# Patient Record
Sex: MALE | Race: WHITE | Employment: OTHER | URBAN - METROPOLITAN AREA
[De-identification: names, ages, dates, MRNs, and addresses within clinical notes are randomized per-mention and may not be internally consistent; named-entity substitution may affect disease eponyms.]

---

## 2017-02-01 ENCOUNTER — OP HISTORICAL/CONVERTED ENCOUNTER (OUTPATIENT)
Dept: OTHER | Age: 52
End: 2017-02-01

## 2017-05-25 ENCOUNTER — OP HISTORICAL/CONVERTED ENCOUNTER (OUTPATIENT)
Dept: OTHER | Age: 52
End: 2017-05-25

## 2017-05-26 ENCOUNTER — OP HISTORICAL/CONVERTED ENCOUNTER (OUTPATIENT)
Dept: OTHER | Age: 52
End: 2017-05-26

## 2017-08-10 ENCOUNTER — OFFICE VISIT (OUTPATIENT)
Dept: NEUROLOGY | Age: 52
End: 2017-08-10

## 2017-08-10 VITALS
OXYGEN SATURATION: 98 % | HEART RATE: 75 BPM | HEIGHT: 71 IN | DIASTOLIC BLOOD PRESSURE: 78 MMHG | BODY MASS INDEX: 29.26 KG/M2 | SYSTOLIC BLOOD PRESSURE: 140 MMHG | WEIGHT: 209 LBS

## 2017-08-10 DIAGNOSIS — G43.009 MIGRAINE WITHOUT AURA AND WITHOUT STATUS MIGRAINOSUS, NOT INTRACTABLE: ICD-10-CM

## 2017-08-10 DIAGNOSIS — G44.309 POST-CONCUSSION HEADACHE: ICD-10-CM

## 2017-08-10 DIAGNOSIS — G44.40 MEDICATION OVERUSE HEADACHE: Primary | ICD-10-CM

## 2017-08-10 DIAGNOSIS — G44.221 CHRONIC TENSION-TYPE HEADACHE, INTRACTABLE: ICD-10-CM

## 2017-08-10 RX ORDER — METHYLPREDNISOLONE 4 MG/1
TABLET ORAL
Qty: 1 DOSE PACK | Refills: 0 | Status: SHIPPED | OUTPATIENT
Start: 2017-08-10 | End: 2018-06-08 | Stop reason: ALTCHOICE

## 2017-08-10 RX ORDER — ACETAMINOPHEN 325 MG/1
TABLET ORAL
COMMUNITY

## 2017-08-10 RX ORDER — FLUTICASONE PROPIONATE AND SALMETEROL 500; 50 UG/1; UG/1
1 POWDER RESPIRATORY (INHALATION) EVERY 12 HOURS
COMMUNITY

## 2017-08-10 RX ORDER — SUMATRIPTAN 100 MG/1
TABLET, FILM COATED ORAL
Qty: 9 TAB | Refills: 1 | Status: SHIPPED | OUTPATIENT
Start: 2017-08-10 | End: 2017-10-26 | Stop reason: SDUPTHER

## 2017-08-10 RX ORDER — MONTELUKAST SODIUM 10 MG/1
10 TABLET ORAL DAILY
COMMUNITY

## 2017-08-10 RX ORDER — TOPIRAMATE 25 MG/1
TABLET ORAL
Qty: 45 TAB | Refills: 0 | Status: SHIPPED | OUTPATIENT
Start: 2017-08-10 | End: 2017-10-10

## 2017-08-10 RX ORDER — CETIRIZINE HCL 10 MG
TABLET ORAL
COMMUNITY

## 2017-08-10 RX ORDER — GABAPENTIN 300 MG/1
300 CAPSULE ORAL
COMMUNITY
End: 2019-05-24

## 2017-08-10 RX ORDER — OMEPRAZOLE 20 MG/1
20 CAPSULE, DELAYED RELEASE ORAL DAILY
COMMUNITY
End: 2019-05-24

## 2017-08-10 RX ORDER — LEVOFLOXACIN 500 MG/1
TABLET, FILM COATED ORAL DAILY
COMMUNITY
End: 2019-05-24

## 2017-08-10 RX ORDER — ALBUTEROL SULFATE 90 UG/1
AEROSOL, METERED RESPIRATORY (INHALATION)
COMMUNITY

## 2017-08-10 RX ORDER — TOPIRAMATE 50 MG/1
TABLET, FILM COATED ORAL
Qty: 60 TAB | Refills: 0 | Status: SHIPPED | OUTPATIENT
Start: 2017-08-10 | End: 2017-10-10 | Stop reason: SDUPTHER

## 2017-08-10 RX ORDER — TAMSULOSIN HYDROCHLORIDE 0.4 MG/1
0.4 CAPSULE ORAL DAILY
COMMUNITY

## 2017-08-10 RX ORDER — SULFAMETHOXAZOLE AND TRIMETHOPRIM 800; 160 MG/1; MG/1
1 TABLET ORAL 2 TIMES DAILY
COMMUNITY
End: 2018-06-08 | Stop reason: ALTCHOICE

## 2017-08-10 RX ORDER — PRAZOSIN HYDROCHLORIDE 2 MG/1
2 CAPSULE ORAL
COMMUNITY
End: 2019-05-24

## 2017-08-10 RX ORDER — FLUTICASONE PROPIONATE 50 MCG
2 SPRAY, SUSPENSION (ML) NASAL DAILY
COMMUNITY

## 2017-08-10 RX ORDER — IBUPROFEN 800 MG/1
TABLET ORAL
COMMUNITY
End: 2017-08-10

## 2017-08-10 RX ORDER — ESZOPICLONE 2 MG/1
3 TABLET, FILM COATED ORAL
COMMUNITY
End: 2019-05-24

## 2017-08-10 RX ORDER — SERTRALINE HYDROCHLORIDE 100 MG/1
TABLET, FILM COATED ORAL DAILY
COMMUNITY
End: 2019-05-24

## 2017-08-10 NOTE — PROGRESS NOTES
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 He describes being in the [employer] for 30 years and started having headaches around . 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 He describes his headaches as being daily, intermittent, moderate throbbing pain across forehead, associated with light sensitivity, no sound sensitivity, occasional nausea, no vomiting. Headache lasts for most of the work day, better in evening. Sometimes has AM headache. Does snore and going to get checked for CYNTHIA tomorrow. Had been taking Motrin 800 mg one to two tabs, twice a day for a couple years for both the headaches and multiple areas of body pain/ aching. Never been on a daily headache preventive medication. Never tried triptans, never tried Fioricet/ butalbital.  Combination of Anxiety, Depression, PTSD and taking Zoloft and Prazosin.       Complete Review of Systems: + anxiety, chest pain, depression, diarrhea, falls, fatigue, headache, hearing loss, memory difficulty, muscle pain, muscle weakness, dyspnea, snoring, stomach pain, weight changes; otherwise as noted in HPI     No Known Allergies  Past Medical History:   Diagnosis Date    Liver disease     Migraine     PTSD (post-traumatic stress disorder) 2017     Current Outpatient Prescriptions   Medication Sig Dispense Refill    albuterol (PROVENTIL HFA, VENTOLIN HFA, PROAIR HFA) 90 mcg/actuation inhaler Take  by inhalation.  acetaminophen (TYLENOL) 325 mg tablet Take  by mouth every four (4) hours as needed for Pain.  montelukast (SINGULAIR) 10 mg tablet Take 10 mg by mouth daily.  cetirizine (ZYRTEC) 10 mg tablet Take  by mouth.  fish oil-omega-3 fatty acids 340-1,000 mg capsule Take 1 Cap by mouth daily.  prazosin (MINIPRESS) 2 mg capsule Take 2 mg by mouth nightly.  fluticasone-salmeterol (ADVAIR DISKUS) 500-50 mcg/dose diskus inhaler Take 1 Puff by inhalation every twelve (12) hours.  gabapentin (NEURONTIN) 300 mg capsule Take 300 mg by mouth. 2 caps PO qhs      omeprazole (PRILOSEC) 20 mg capsule Take 20 mg by mouth daily.  levoFLOXacin (LEVAQUIN) 500 mg tablet Take  by mouth daily.  fluticasone (FLONASE) 50 mcg/actuation nasal spray 2 Sprays by Both Nostrils route daily.  eszopiclone (LUNESTA) 2 mg tablet Take  by mouth nightly.  sertraline (ZOLOFT) 100 mg tablet Take  by mouth daily.  trimethoprim-sulfamethoxazole (BACTRIM DS, SEPTRA DS) 160-800 mg per tablet Take 1 Tab by mouth two (2) times a day.  tamsulosin (FLOMAX) 0.4 mg capsule Take 0.4 mg by mouth daily.  CELECOXIB (CELEBREX PO) Take  by mouth.  methylPREDNISolone (MEDROL DOSEPACK) 4 mg tablet Take as directed on package. Take with food in AM.  For rebound headache. 1 Dose Pack 0    SUMAtriptan (IMITREX) 100 mg tablet 1 tab at onset of severe headache/ migraine. Repeat 1 tab in 2 hours if headache remains. Limit: 2 tabs in 24 hours, max 2 days in a week. 9 Tab 1    topiramate (TOPAMAX) 25 mg tablet Week 1: 1 tab in PM.  Week 2: 1 tab twice a day. Week 3: 1 tab AM and 2 tabs PM.  Week 4: CHANGE TO 50 mg tablets 45 Tab 0    topiramate (TOPAMAX) 50 mg tablet Take 1 tab twice a day for migraine prevention 60 Tab 0     History reviewed. No pertinent surgical history.   Family History   Problem Relation Age of Onset    Cancer Mother     Cancer Father      Social History     Social History    Marital status:      Spouse name: N/A    Number of children: N/A    Years of education: N/A     Occupational History    Not on file. Social History Main Topics    Smoking status: Former Smoker     Quit date: 8/10/2001    Smokeless tobacco: Never Used    Alcohol use Yes      Comment: 10-12 beer, wine, vodka    Drug use: Not on file    Sexual activity: Not on file     Other Topics Concern    Not on file     Social History Narrative    No narrative on file       PHYSICAL EXAM  Vitals:    08/10/17 1508   BP: 140/78   BP 1 Location: Left arm   BP Patient Position: Sitting   Pulse: 75   SpO2: 98%   Weight: 94.8 kg (209 lb)   Height: 5' 11\" (1.803 m)       General:  Alert, cooperative, NAD   Head:  Normocephalic, atraumatic   Eyes:  Conjunctivae/corneas clear   Lungs:  Heart:  Non labored breathing  Regular rate, rhythm   Extremities: No edema. Skin: No rashes    Neurologic Exam       Language: normal  Memory:  Alert, oriented to person, place, situation    Cranial Nerves:  I: smell Not tested   II: visual fields Full to confrontation   II: pupils Equal, round, reactive to light   II: optic disc No papilledema   III,VII: ptosis none   III,IV,VI: extraocular muscles  normal   V: facial light touch sensation  normal   VII: facial muscle function  symmetric   VIII: hearing symmetric   IX: soft palate elevation  normal   XI: sternocleidomastoid strength 5/5   XII: tongue  midline      Motor: normal bulk, tone, strength in all exts    Sensory: mild patchy reduced temperature in right foot/ lower leg, but otherwise intact LT, proprioception, temperature and vibration in all extremities; Cerebellar: no rest, postural, or intention tremor. Normal FNF and H-Shin bilaterally. Reflexes: 2+ throughout. Plantar response: neutral bilaterally  . Gait: normal gait including tandem.   Romberg negative     No outside clinic notes/ imaging reports available for review    ASSESSMENT AND PLAN ICD-10-CM ICD-9-CM    1. Medication overuse headache G44.40 339.3 methylPREDNISolone (MEDROL DOSEPACK) 4 mg tablet   2. Chronic tension-type headache, intractable G44.221 339.12 topiramate (TOPAMAX) 25 mg tablet      topiramate (TOPAMAX) 50 mg tablet   3. Post-concussion headache G44.309 339.20 topiramate (TOPAMAX) 25 mg tablet      topiramate (TOPAMAX) 50 mg tablet   4. Migraine without aura and without status migrainosus, not intractable G43.009 346.10 SUMAtriptan (IMITREX) 100 mg tablet      topiramate (TOPAMAX) 25 mg tablet      topiramate (TOPAMAX) 50 mg tablet       46 y.o. male with hx of multiple minor head injuries consistent with mild concussions, medication overuse headache, chronic tension headache (hx of anxiety, depression, PTSD), component of episodic migraine headache (without aura), and potential CYNTHIA (to have sleep study soon) causing some portion of his headaches. Extended discussion regardin) Medication overuse headache: pt stopped taking frequent ibuprofen 2 weeks ago and was put on Celebrex by Orthopedics. He may be experiencing withdrawal headache so gave him Rx of Medrol DosePak to take. Advised not to use a headache pain reliever more than 2 days a week or will lead to having [address]. 2) Chronic tension headache: pt working with Psychiatry to address underlying mood and sleep difficulties. 3) Potential CYNTHIA: to have sleep study and if diagnosed with CYNTHIA, treating that could reduce his headaches. 4) Post-concussion headache: I think this is playing a role in his headache frequency. No specific treatment/ medication for concussion symptoms but being on a daily headache preventive may help reduce his headaches as a whole, regardless of headache type. 5) Migraine without aura: in addition to trying a daily HA preventive, gave patient for Imitrx 100 mg tab to use prn migraine/ severe headache, up to 2 days in a week.   6) Discussed different headache preventive medication options: propranolol (not an option due to his underlying asthma), topamax (may reduce headaches, not sure how it would affect his mood), or amitriptyline (may help with his insomnia, not sure how it would affect mood)     Agreed that I would give him Rxs for Topamax 25 mg (tapering up to 50 mg BID) and he would not start them until he saw his psychiatrist and got clearance to do so. Also wrote a brief note in his patient instructions for him to ask Psychiatrist if changing his sleep medication to Amitriptyline would be appropriate as it could help the insomnia and reduce the headache frequency.         F/u in 7 weeks        Sincerely,  Majo Garay MD

## 2017-08-10 NOTE — MR AVS SNAPSHOT
Visit Information Date & Time Provider Department Dept. Phone Encounter #  
 8/10/2017  3:00 PM Kerry Valdez MD [facility] located within [facility] [phone] [id number] Follow-up Instructions Return in about 6 weeks (around 9/21/2017). Your Appointments 9/25/2017  3:00 PM  
Follow Up with Kerry Valdez MD  
[facility]) Appt Note: follow up headache [place] [address] [facility] [id number] [phone]  
  
   
 [place] [address] Upcoming Health Maintenance Date Due Hepatitis C Screening 1965 DTaP/Tdap/Td series (1 - Tdap) 3/4/1986 FOBT Q 1 YEAR AGE 50-75 3/4/2015 INFLUENZA AGE 9 TO ADULT 8/1/2017 Allergies as of 8/10/2017  Review Complete On: 8/10/2017 By: Imtiaz Lund LPN No Known Allergies Current Immunizations  Never Reviewed No immunizations on file. Not reviewed this visit You Were Diagnosed With   
  
 Codes Comments Medication overuse headache    -  Primary ICD-10-CM: G44.40 ICD-9-CM: 339. 3 Chronic tension-type headache, intractable     ICD-10-CM: G78.813 ICD-9-CM: 339.12 Post-concussion headache     ICD-10-CM: T57.674 ICD-9-CM: 339.20 Migraine without aura and without status migrainosus, not intractable     ICD-10-CM: D40.850 ICD-9-CM: 346.10 Vitals BP Pulse Height(growth percentile) Weight(growth percentile) SpO2 BMI  
 140/78 (BP 1 Location: Left arm, BP Patient Position: Sitting) 75 5' 11\" (1.803 m) 209 lb (94.8 kg) 98% 29.15 kg/m2 Smoking Status Former Smoker Vitals History BMI and BSA Data Body Mass Index Body Surface Area  
 29.15 kg/m 2 2.18 m 2 Preferred Pharmacy Pharmacy Name Phone [address] [phone] Your Updated Medication List  
  
   
 This list is accurate as of: 8/10/17  4:05 PM.  Always use your most recent med list.  
  
  
  
  
 acetaminophen 325 mg tablet Commonly known as:  TYLENOL Take  by mouth every four (4) hours as needed for Pain. ADVAIR DISKUS 500-50 mcg/dose diskus inhaler Generic drug:  fluticasone-salmeterol Take 1 Puff by inhalation every twelve (12) hours. albuterol 90 mcg/actuation inhaler Commonly known as:  PROVENTIL HFA, VENTOLIN HFA, PROAIR HFA Take  by inhalation. CELEBREX PO Take  by mouth. cetirizine 10 mg tablet Commonly known as:  ZYRTEC Take  by mouth. fish oil-omega-3 fatty acids 340-1,000 mg capsule Take 1 Cap by mouth daily. FLOMAX 0.4 mg capsule Generic drug:  tamsulosin Take 0.4 mg by mouth daily. fluticasone 50 mcg/actuation nasal spray Commonly known as:  Jazmin Mealy 2 Sprays by Both Nostrils route daily. gabapentin 300 mg capsule Commonly known as:  NEURONTIN Take 300 mg by mouth. 2 caps PO qhs  
  
 LEVAQUIN 500 mg tablet Generic drug:  levoFLOXacin Take  by mouth daily. LUNESTA 2 mg tablet Generic drug:  eszopiclone Take  by mouth nightly. methylPREDNISolone 4 mg tablet Commonly known as:  Jodeane Hove Take as directed on package. Take with food in AM.  For rebound headache.  
  
 montelukast 10 mg tablet Commonly known as:  SINGULAIR Take 10 mg by mouth daily. omeprazole 20 mg capsule Commonly known as:  PRILOSEC Take 20 mg by mouth daily. prazosin 2 mg capsule Commonly known as:  MINIPRESS Take 2 mg by mouth nightly. SUMAtriptan 100 mg tablet Commonly known as:  IMITREX  
1 tab at onset of severe headache/ migraine. Repeat 1 tab in 2 hours if headache remains. Limit: 2 tabs in 24 hours, max 2 days in a week. * topiramate 25 mg tablet Commonly known as:  TOPAMAX Week 1: 1 tab in PM.  Week 2: 1 tab twice a day.   Week 3: 1 tab AM and 2 tabs PM.  Week 4: CHANGE TO 50 mg tablets * topiramate 50 mg tablet Commonly known as:  TOPAMAX Take 1 tab twice a day for migraine prevention  
  
 trimethoprim-sulfamethoxazole 160-800 mg per tablet Commonly known as:  BACTRIM DS, SEPTRA DS Take 1 Tab by mouth two (2) times a day. ZOLOFT 100 mg tablet Generic drug:  sertraline Take  by mouth daily. * Notice: This list has 2 medication(s) that are the same as other medications prescribed for you. Read the directions carefully, and ask your doctor or other care provider to review them with you. Prescriptions Printed Refills  
 methylPREDNISolone (MEDROL DOSEPACK) 4 mg tablet 0 Sig: Take as directed on package. Take with food in AM.  For rebound headache. Class: Print SUMAtriptan (IMITREX) 100 mg tablet 1 Si tab at onset of severe headache/ migraine. Repeat 1 tab in 2 hours if headache remains. Limit: 2 tabs in 24 hours, max 2 days in a week. Class: Print  
 topiramate (TOPAMAX) 25 mg tablet 0 Sig: Week 1: 1 tab in PM.  Week 2: 1 tab twice a day. Week 3: 1 tab AM and 2 tabs PM.  Week 4: CHANGE TO 50 mg tablets Class: Print  
 topiramate (TOPAMAX) 50 mg tablet 0 Sig: Take 1 tab twice a day for migraine prevention Class: Print Follow-up Instructions Return in about 6 weeks (around 2017). Patient Instructions 1. Discussed that Topamax may help reduce your headache frequency but because it's can affect your mood (\"mood stabilizer\") I don't want you to start it until you discuss with your Psychiatrist. 
 
2. Another topic to discuss with your Psychiatrist is whether Amitriptyline would be a good choice for the insomnia, headaches, and mood/ depression. I'll defer to him whether to start that medication 3. Discussed the steroid pack and when to take 4. Use the sumatriptan for the bad/ severe headaches only (for the migraine headaches). 5. See you back in 7 weeks Introducing [facility]! [employer] introduces [employer] patient portal. Now you can access parts of your medical record, email your doctor's office, and request medication refills online. 1. In your internet browser, go to [email or web address] 2. Click on the First Time User? Click Here link in the Sign In box. You will see the New Member Sign Up page. 3. Enter your [employer] Access Code exactly as it appears below. You will not need to use this code after youve completed the sign-up process. If you do not sign up before the expiration date, you must request a new code. · [employer] Access Code: [email or web address] Expires: 11/8/2017  2:31 PM 
 
4. Enter the last four digits of your Social Security Number (xxxx) and Date of Birth (mm/dd/yyyy) as indicated and click Submit. You will be taken to the next sign-up page. 5. Create a [employer] ID. This will be your [employer] login ID and cannot be changed, so think of one that is secure and easy to remember. 6. Create a [employer] password. You can change your password at any time. 7. Enter your Password Reset Question and Answer. This can be used at a later time if you forget your password. 8. Enter your e-mail address. You will receive e-mail notification when new information is available in [address] 9. Click Sign Up. You can now view and download portions of your medical record. 10. Click the Download Summary menu link to download a portable copy of your medical information. If you have questions, please visit the Frequently Asked Questions section of the [employer] website. Remember, [employer] is NOT to be used for urgent needs. For medical emergencies, dial 911. Now available from your iPhone and Android! Please provide this summary of care documentation to your next provider. Your primary care clinician is listed as Dahlia Rosario.  If you have any questions after today's visit, please call [phone].

## 2017-08-10 NOTE — PATIENT INSTRUCTIONS
1. Discussed that Topamax may help reduce your headache frequency but because it's can affect your mood (\"mood stabilizer\") I don't want you to start it until you discuss with your Psychiatrist.    2. Another topic to discuss with your Psychiatrist is whether Amitriptyline would be a good choice for the insomnia, headaches, and mood/ depression. I'll defer to him whether to start that medication    3. Discussed the steroid pack and when to take    4. Use the sumatriptan for the bad/ severe headaches only (for the migraine headaches).     5. See you back in 7 weeks

## 2017-10-10 DIAGNOSIS — G43.009 MIGRAINE WITHOUT AURA AND WITHOUT STATUS MIGRAINOSUS, NOT INTRACTABLE: ICD-10-CM

## 2017-10-10 DIAGNOSIS — G44.309 POST-CONCUSSION HEADACHE: ICD-10-CM

## 2017-10-10 DIAGNOSIS — G44.221 CHRONIC TENSION-TYPE HEADACHE, INTRACTABLE: ICD-10-CM

## 2017-10-10 RX ORDER — TOPIRAMATE 50 MG/1
TABLET, FILM COATED ORAL
Qty: 60 TAB | Refills: 0 | Status: SHIPPED | OUTPATIENT
Start: 2017-10-10 | End: 2017-10-26 | Stop reason: SDUPTHER

## 2017-10-10 RX ORDER — TOPIRAMATE 50 MG/1
TABLET, FILM COATED ORAL
Qty: 60 TAB | Refills: 0 | Status: SHIPPED | OUTPATIENT
Start: 2017-10-10 | End: 2017-10-10 | Stop reason: SDUPTHER

## 2017-10-10 NOTE — TELEPHONE ENCOUNTER
Pt cancelled appt on 10-5. Will send 30 day Rx but will need to be seen before then for further refills (i.e adjust f/u visit).   Ignaciax

## 2017-10-10 NOTE — TELEPHONE ENCOUNTER
----- Message from Talya Garces sent at 10/10/2017  8:20 AM EDT -----  Regarding: Dr. Carola Kussmaul request  Pt needs a refill for Topiranet, generic for Topamax sent to the [facility] in [address] [place] [address]. Pt will run out of medication tomorrow. Pt can be reached at [phone].

## 2017-10-11 ENCOUNTER — TELEPHONE (OUTPATIENT)
Dept: NEUROLOGY | Age: 52
End: 2017-10-11

## 2017-10-11 NOTE — TELEPHONE ENCOUNTER
----- Message from Juan Garza sent at 10/11/2017 10:46 AM EDT -----  Regarding: Dr Francisco Singh is returning a call to [facility], Dr. Lidia Guevara nurse for the second time. Pt would like a call back regarding a prescription refill. Please call pt back today. Pt can be reached at [phone].

## 2017-10-11 NOTE — TELEPHONE ENCOUNTER
Contacted patient and informed him his topmax was sent to the pharmacy yesterday.  R/s his fup for Wednesday, November 01, 2017 10:40 AM

## 2017-10-18 ENCOUNTER — TELEPHONE (OUTPATIENT)
Dept: NEUROLOGY | Age: 52
End: 2017-10-18

## 2017-10-18 NOTE — TELEPHONE ENCOUNTER
Called pt to reschedule 11/1 appt, he said you called him and told him he had to come in sooner than the 11/30 original appt in order to get a refill on his Topamax.

## 2017-10-26 ENCOUNTER — OFFICE VISIT (OUTPATIENT)
Dept: NEUROLOGY | Age: 52
End: 2017-10-26

## 2017-10-26 VITALS
HEIGHT: 71 IN | WEIGHT: 191 LBS | SYSTOLIC BLOOD PRESSURE: 112 MMHG | OXYGEN SATURATION: 98 % | DIASTOLIC BLOOD PRESSURE: 74 MMHG | BODY MASS INDEX: 26.74 KG/M2 | HEART RATE: 65 BPM

## 2017-10-26 DIAGNOSIS — G43.009 MIGRAINE WITHOUT AURA AND WITHOUT STATUS MIGRAINOSUS, NOT INTRACTABLE: Primary | ICD-10-CM

## 2017-10-26 DIAGNOSIS — G44.229 CHRONIC TENSION-TYPE HEADACHE, NOT INTRACTABLE: ICD-10-CM

## 2017-10-26 DIAGNOSIS — G44.309 POST-CONCUSSION HEADACHE: ICD-10-CM

## 2017-10-26 PROBLEM — Z91.199 NO-SHOW FOR APPOINTMENT: Status: RESOLVED | Noted: 2017-10-26 | Resolved: 2017-10-26

## 2017-10-26 PROBLEM — G44.221 CHRONIC TENSION-TYPE HEADACHE, INTRACTABLE: Status: RESOLVED | Noted: 2017-08-10 | Resolved: 2017-10-26

## 2017-10-26 PROBLEM — Z91.199 NO-SHOW FOR APPOINTMENT: Status: ACTIVE | Noted: 2017-10-26

## 2017-10-26 PROBLEM — G44.40 MEDICATION OVERUSE HEADACHE: Status: RESOLVED | Noted: 2017-08-10 | Resolved: 2017-10-26

## 2017-10-26 RX ORDER — SUMATRIPTAN 100 MG/1
TABLET, FILM COATED ORAL
Qty: 9 TAB | Refills: 1 | Status: SHIPPED | OUTPATIENT
Start: 2017-10-26 | End: 2018-04-26 | Stop reason: SDUPTHER

## 2017-10-26 RX ORDER — TOPIRAMATE 50 MG/1
TABLET, FILM COATED ORAL
Qty: 180 TAB | Refills: 1 | Status: SHIPPED | OUTPATIENT
Start: 2017-10-26 | End: 2018-04-26 | Stop reason: SDUPTHER

## 2017-10-26 NOTE — PROGRESS NOTES
Interval HPI:   This is a 46 y.o. male who is following up for     Chief Complaint   Patient presents with    Headache     4 per month lasting 1/2 a day without imitrex. Imitrex will take the headache away. At initial visit reported having daily, moderate, throbbing pain across forehead (light sensitivity, no sound sensitivity, occasional nausea, no vomiting), lasting for most of the work day, better in evening. Started pt on Topamax working up to 50 mg BID, cut out all caffeine and alcohol, and in process of retiring. He also tried Amitriptyline with his Psychiatrist but didn't help sleep or headaches, and triggered some PTSD. # of headache days a week: 3-4  # of migraine days a week: 1   Imitrex 100 mg tablets knock out migraine      Brief ROS: as above or otherwise negative  There have been no significant changes in PMHx, PSHx, SHx except as noted above. No Known Allergies  Current Outpatient Prescriptions   Medication Sig Dispense Refill    topiramate (TOPAMAX) 50 mg tablet Take 1 tab twice a day for migraine prevention 180 Tab 1    SUMAtriptan (IMITREX) 100 mg tablet 1 tab at onset of severe headache/ migraine. Repeat 1 tab in 2 hours if headache remains. Limit: max 2 days in a week. 9 Tab 1    albuterol (PROVENTIL HFA, VENTOLIN HFA, PROAIR HFA) 90 mcg/actuation inhaler Take  by inhalation.  montelukast (SINGULAIR) 10 mg tablet Take 10 mg by mouth daily.  cetirizine (ZYRTEC) 10 mg tablet Take  by mouth.  fluticasone-salmeterol (ADVAIR DISKUS) 500-50 mcg/dose diskus inhaler Take 1 Puff by inhalation every twelve (12) hours.  fluticasone (FLONASE) 50 mcg/actuation nasal spray 2 Sprays by Both Nostrils route daily.  eszopiclone (LUNESTA) 2 mg tablet Take 3 mg by mouth nightly.  tamsulosin (FLOMAX) 0.4 mg capsule Take 0.4 mg by mouth daily.  CELECOXIB (CELEBREX PO) Take  by mouth.       acetaminophen (TYLENOL) 325 mg tablet Take  by mouth every four (4) hours as needed for Pain.  fish oil-omega-3 fatty acids 340-1,000 mg capsule Take 1 Cap by mouth daily.  prazosin (MINIPRESS) 2 mg capsule Take 2 mg by mouth nightly.  gabapentin (NEURONTIN) 300 mg capsule Take 300 mg by mouth. 2 caps PO qhs      omeprazole (PRILOSEC) 20 mg capsule Take 20 mg by mouth daily.  levoFLOXacin (LEVAQUIN) 500 mg tablet Take  by mouth daily.  sertraline (ZOLOFT) 100 mg tablet Take  by mouth daily.  trimethoprim-sulfamethoxazole (BACTRIM DS, SEPTRA DS) 160-800 mg per tablet Take 1 Tab by mouth two (2) times a day.  methylPREDNISolone (MEDROL DOSEPACK) 4 mg tablet Take as directed on package. Take with food in AM.  For rebound headache. 1 Dose Pack 0       Physical Exam  Blood pressure 112/74, pulse 65, height 5' 11\" (1.803 m), weight 86.6 kg (191 lb), SpO2 98 %. No acute distress  Neck: no stiffness  Skin: no rashes    Focused Neurological Exam     Mental status: Alert and oriented to person, place situation. Language: normal fluency and comprehension; no dysarthria. CNs:   Visual fields grossly normal  Extraocular movements intact, no nystagmus  Face appears symmetric and facial strength normal.    Hearing is intact to casual conversation. Sensory: intact light touch in arms and legs  Motor: Normal bulk and strength in all 4 extremities. Reflexes: DTRs are symmetric, 2+ patellars  Gait: not observed    Impression      ICD-10-CM ICD-9-CM    1. Migraine without aura and without status migrainosus, not intractable G43.009 346.10 topiramate (TOPAMAX) 50 mg tablet      SUMAtriptan (IMITREX) 100 mg tablet   2. Chronic tension-type headache, not intractable G44.229 339.12    3.  Post-concussion headache G44.309 339.20 topiramate (TOPAMAX) 50 mg tablet         Has had drastic reduction in HA frequency after making lifestyle changes and starting topamax  Continue at current dose (setn 90 day Rx with 1 RF)  Renewed Rx Sumatriptan 100 mg tab prn migraine  F/u in 6 months

## 2017-10-26 NOTE — MR AVS SNAPSHOT
Visit Information Date & Time Provider Department Dept. Phone Encounter #  
 10/26/2017  9:40 AM Josi Uribe MD [facility] Neurology [facility] [phone] [id number] Follow-up Instructions Return in about 6 months (around 4/26/2018). Follow-up and Disposition History Upcoming Health Maintenance Date Due Hepatitis C Screening 1965 DTaP/Tdap/Td series (1 - Tdap) 3/4/1986 FOBT Q 1 YEAR AGE 50-75 3/4/2015 INFLUENZA AGE 9 TO ADULT 8/1/2017 Allergies as of 10/26/2017  Review Complete On: 10/26/2017 By: Gearl Kanner, LPN No Known Allergies Current Immunizations  Never Reviewed No immunizations on file. Not reviewed this visit You Were Diagnosed With   
  
 Codes Comments Migraine without aura and without status migrainosus, not intractable    -  Primary ICD-10-CM: G43.009 ICD-9-CM: 346.10 Chronic tension-type headache, not intractable     ICD-10-CM: S98.105 ICD-9-CM: 339.12 Post-concussion headache     ICD-10-CM: O80.740 ICD-9-CM: 339.20 Vitals BP Pulse Height(growth percentile) Weight(growth percentile) SpO2 BMI  
 112/74 (BP 1 Location: Left arm, BP Patient Position: Sitting) 65 5' 11\" (1.803 m) 191 lb (86.6 kg) 98% 26.64 kg/m2 Smoking Status Former Smoker Vitals History BMI and BSA Data Body Mass Index Body Surface Area  
 26.64 kg/m 2 2.08 m 2 Preferred Pharmacy Pharmacy Name Phone [facility] ISABELA BENSON [address], [address] [phone] Your Updated Medication List  
  
   
This list is accurate as of: 10/26/17 10:25 AM.  Always use your most recent med list.  
  
  
  
  
 acetaminophen 325 mg tablet Commonly known as:  TYLENOL Take  by mouth every four (4) hours as needed for Pain. ADVAIR DISKUS 500-50 mcg/dose diskus inhaler Generic drug:  fluticasone-salmeterol Take 1 Puff by inhalation every twelve (12) hours. albuterol 90 mcg/actuation inhaler Commonly known as:  PROVENTIL HFA, VENTOLIN HFA, PROAIR HFA Take  by inhalation. CELEBREX PO Take  by mouth. cetirizine 10 mg tablet Commonly known as:  ZYRTEC Take  by mouth. fish oil-omega-3 fatty acids 340-1,000 mg capsule Take 1 Cap by mouth daily. FLOMAX 0.4 mg capsule Generic drug:  tamsulosin Take 0.4 mg by mouth daily. fluticasone 50 mcg/actuation nasal spray Commonly known as:  Ro Blizzard 2 Sprays by Both Nostrils route daily. gabapentin 300 mg capsule Commonly known as:  NEURONTIN Take 300 mg by mouth. 2 caps PO qhs  
  
 LEVAQUIN 500 mg tablet Generic drug:  levoFLOXacin Take  by mouth daily. LUNESTA 2 mg tablet Generic drug:  eszopiclone Take 3 mg by mouth nightly. methylPREDNISolone 4 mg tablet Commonly known as:  Cleta Loss Take as directed on package. Take with food in AM.  For rebound headache.  
  
 montelukast 10 mg tablet Commonly known as:  SINGULAIR Take 10 mg by mouth daily. omeprazole 20 mg capsule Commonly known as:  PRILOSEC Take 20 mg by mouth daily. prazosin 2 mg capsule Commonly known as:  MINIPRESS Take 2 mg by mouth nightly. SUMAtriptan 100 mg tablet Commonly known as:  IMITREX  
1 tab at onset of severe headache/ migraine. Repeat 1 tab in 2 hours if headache remains. Limit: max 2 days in a week. topiramate 50 mg tablet Commonly known as:  TOPAMAX Take 1 tab twice a day for migraine prevention  
  
 trimethoprim-sulfamethoxazole 160-800 mg per tablet Commonly known as:  BACTRIM DS, SEPTRA DS Take 1 Tab by mouth two (2) times a day. ZOLOFT 100 mg tablet Generic drug:  sertraline Take  by mouth daily. Prescriptions Printed Refills SUMAtriptan (IMITREX) 100 mg tablet 1 Si tab at onset of severe headache/ migraine.   Repeat 1 tab in 2 hours if headache remains. Limit: max 2 days in a week. Class: Print Prescriptions Sent to Pharmacy Refills  
 topiramate (TOPAMAX) 50 mg tablet 1 Sig: Take 1 tab twice a day for migraine prevention Class: Normal  
 Pharmacy: [facility] [address]  #: [phone] Follow-up Instructions Return in about 6 months (around 4/26/2018). Introducing [facility] & HEALTH SERVICES! Deneen Vargas introduces [employer] patient portal. Now you can access parts of your medical record, email your doctor's office, and request medication refills online. 1. In your internet browser, go to [email or web address] 2. Click on the First Time User? Click Here link in the Sign In box. You will see the New Member Sign Up page. 3. Enter your [employer] Access Code exactly as it appears below. You will not need to use this code after youve completed the sign-up process. If you do not sign up before the expiration date, you must request a new code. · [employer] Access Code: [email or web address] Expires: 11/8/2017  2:31 PM 
 
4. Enter the last four digits of your Social Security Number (xxxx) and Date of Birth (mm/dd/yyyy) as indicated and click Submit. You will be taken to the next sign-up page. 5. Create a [employer] ID. This will be your [employer] login ID and cannot be changed, so think of one that is secure and easy to remember. 6. Create a [employer] password. You can change your password at any time. 7. Enter your Password Reset Question and Answer. This can be used at a later time if you forget your password. 8. Enter your e-mail address. You will receive e-mail notification when new information is available in [address] 9. Click Sign Up. You can now view and download portions of your medical record. 10. Click the Download Summary menu link to download a portable copy of your medical information.  
 
If you have questions, please visit the Frequently Asked Questions section of the [employer]. Remember, ResponseTek[employer] is NOT to be used for urgent needs. For medical emergencies, dial 911. Now available from your iPhone and Android! Please provide this summary of care documentation to your next provider. Your primary care clinician is listed as Don Breaker. If you have any questions after today's visit, please call [phone].

## 2018-04-26 ENCOUNTER — OFFICE VISIT (OUTPATIENT)
Dept: NEUROLOGY | Age: 53
End: 2018-04-26

## 2018-04-26 VITALS
OXYGEN SATURATION: 99 % | BODY MASS INDEX: 26.74 KG/M2 | WEIGHT: 191 LBS | DIASTOLIC BLOOD PRESSURE: 72 MMHG | SYSTOLIC BLOOD PRESSURE: 120 MMHG | HEIGHT: 71 IN | HEART RATE: 68 BPM

## 2018-04-26 DIAGNOSIS — G43.009 MIGRAINE WITHOUT AURA AND WITHOUT STATUS MIGRAINOSUS, NOT INTRACTABLE: ICD-10-CM

## 2018-04-26 DIAGNOSIS — G44.309 POST-CONCUSSION HEADACHE: Primary | ICD-10-CM

## 2018-04-26 DIAGNOSIS — F51.04 PSYCHOPHYSIOLOGICAL INSOMNIA: ICD-10-CM

## 2018-04-26 RX ORDER — SUMATRIPTAN 100 MG/1
TABLET, FILM COATED ORAL
Qty: 9 TAB | Refills: 2 | Status: SHIPPED | OUTPATIENT
Start: 2018-04-26 | End: 2019-02-22 | Stop reason: SDUPTHER

## 2018-04-26 RX ORDER — TOPIRAMATE 50 MG/1
TABLET, FILM COATED ORAL
Qty: 180 TAB | Refills: 2 | Status: SHIPPED | OUTPATIENT
Start: 2018-04-26 | End: 2019-04-05

## 2018-04-26 RX ORDER — QUETIAPINE FUMARATE 50 MG/1
TABLET, FILM COATED ORAL
Refills: 1 | COMMUNITY
Start: 2018-03-20 | End: 2019-05-24

## 2018-04-26 NOTE — PROGRESS NOTES
Interval HPI:   This is a 48 y.o. male who is following up for     Chief Complaint   Patient presents with    Migraine     1 every 2 weeks    Insomnia       Retired from [employer] since his last visit  Continues to do well with Topamax (50 mg BID) for headache prevention  # of headache days a week: 2-3  # of migraine days a month: 2-3 (was  1 day week)  Imitrex 100 mg tablets knock out migraine    C/o insomnia. Tried Amitriptyline but aggravated his PTSD  Underwent a sleep study that didn't show CYNTHIA but did show insomnia  He reports trying multiple different sleep meds, no help      Brief ROS: as above or otherwise negative  There have been no significant changes in PMHx, PSHx, SHx except as noted above. No Known Allergies  Current Outpatient Prescriptions   Medication Sig Dispense Refill    QUEtiapine (SEROQUEL) 50 mg tablet TAKE 1 TABLET BY MOUTH AT BEDTIME  1    topiramate (TOPAMAX) 50 mg tablet Take 1 tab twice a day for migraine prevention 180 Tab 2    SUMAtriptan (IMITREX) 100 mg tablet 1 tab at onset of severe headache/ migraine. Repeat 1 tab in 2 hours if headache remains. Limit: max 2 days in a week. 9 Tab 2    albuterol (PROVENTIL HFA, VENTOLIN HFA, PROAIR HFA) 90 mcg/actuation inhaler Take  by inhalation.  montelukast (SINGULAIR) 10 mg tablet Take 10 mg by mouth daily.  cetirizine (ZYRTEC) 10 mg tablet Take  by mouth.  fluticasone-salmeterol (ADVAIR DISKUS) 500-50 mcg/dose diskus inhaler Take 1 Puff by inhalation every twelve (12) hours.  omeprazole (PRILOSEC) 20 mg capsule Take 20 mg by mouth daily.  fluticasone (FLONASE) 50 mcg/actuation nasal spray 2 Sprays by Both Nostrils route daily.  sertraline (ZOLOFT) 100 mg tablet Take  by mouth daily.  tamsulosin (FLOMAX) 0.4 mg capsule Take 0.4 mg by mouth daily.  CELECOXIB (CELEBREX PO) Take  by mouth.  acetaminophen (TYLENOL) 325 mg tablet Take  by mouth every four (4) hours as needed for Pain.  fish oil-omega-3 fatty acids 340-1,000 mg capsule Take 1 Cap by mouth daily.  prazosin (MINIPRESS) 2 mg capsule Take 2 mg by mouth nightly.  gabapentin (NEURONTIN) 300 mg capsule Take 300 mg by mouth. 2 caps PO qhs      levoFLOXacin (LEVAQUIN) 500 mg tablet Take  by mouth daily.  eszopiclone (LUNESTA) 2 mg tablet Take 3 mg by mouth nightly.  trimethoprim-sulfamethoxazole (BACTRIM DS, SEPTRA DS) 160-800 mg per tablet Take 1 Tab by mouth two (2) times a day.  methylPREDNISolone (MEDROL DOSEPACK) 4 mg tablet Take as directed on package. Take with food in AM.  For rebound headache. 1 Dose Pack 0       Physical Exam  Blood pressure 120/72, pulse 68, height 5' 11\" (1.803 m), weight 86.6 kg (191 lb), SpO2 99 %. No acute distress  Neck: no stiffness  Skin: no rashes    Focused Neurological Exam     Mental status: Alert and oriented to person, place situation. Language: normal fluency and comprehension; no dysarthria. CNs:   Visual fields grossly normal  Extraocular movements intact, no nystagmus  Face appears symmetric and facial strength normal.    Hearing is intact to casual conversation. Sensory: intact light touch in arms and legs  Motor: Normal bulk and strength in all 4 extremities. Reflexes: DTRs are symmetric, 2+ patellars  Gait: not observed    Impression      ICD-10-CM ICD-9-CM    1. Post-concussion headache G44.309 339.20 topiramate (TOPAMAX) 50 mg tablet   2. Migraine without aura and without status migrainosus, not intractable G43.009 346.10 topiramate (TOPAMAX) 50 mg tablet      SUMAtriptan (IMITREX) 100 mg tablet   3. Psychophysiological insomnia F51.04 307.42        Significant reduction in HA frequency since making lifestyle changes and starting Topamax 50 mg BID. Sent 90 day Rx of that with 2 RFs. Sumatriptan 100 mg tab works for him, using it a few days a month. Sent 1 months Rx + 2 RF to his pharmacy.   D/w patient that I cannot help him with the sleep issue but he can d/w Psychiatrist if increasing the nighttime Seroquel would help him sleep. F/u in 9 months.

## 2018-06-08 ENCOUNTER — OFFICE VISIT (OUTPATIENT)
Dept: FAMILY MEDICINE CLINIC | Age: 53
End: 2018-06-08

## 2018-06-08 VITALS
WEIGHT: 187 LBS | HEIGHT: 71 IN | SYSTOLIC BLOOD PRESSURE: 110 MMHG | OXYGEN SATURATION: 98 % | RESPIRATION RATE: 16 BRPM | BODY MASS INDEX: 26.18 KG/M2 | TEMPERATURE: 98.2 F | DIASTOLIC BLOOD PRESSURE: 68 MMHG | HEART RATE: 71 BPM

## 2018-06-08 DIAGNOSIS — G43.009 MIGRAINE WITHOUT AURA AND WITHOUT STATUS MIGRAINOSUS, NOT INTRACTABLE: ICD-10-CM

## 2018-06-08 DIAGNOSIS — K58.9 IRRITABLE BOWEL SYNDROME, UNSPECIFIED TYPE: ICD-10-CM

## 2018-06-08 DIAGNOSIS — Z76.89 ESTABLISHING CARE WITH NEW DOCTOR, ENCOUNTER FOR: ICD-10-CM

## 2018-06-08 DIAGNOSIS — R35.0 FREQUENCY OF URINATION: Primary | ICD-10-CM

## 2018-06-08 DIAGNOSIS — J45.909 ASTHMA, UNSPECIFIED ASTHMA SEVERITY, UNSPECIFIED WHETHER COMPLICATED, UNSPECIFIED WHETHER PERSISTENT: ICD-10-CM

## 2018-06-08 RX ORDER — OXYBUTYNIN CHLORIDE 10 MG/1
TABLET, EXTENDED RELEASE ORAL
Refills: 3 | COMMUNITY
Start: 2018-03-21 | End: 2019-05-24

## 2018-06-08 RX ORDER — PANTOPRAZOLE SODIUM 40 MG/1
TABLET, DELAYED RELEASE ORAL
Refills: 1 | COMMUNITY
Start: 2018-05-22 | End: 2019-05-24

## 2018-06-08 RX ORDER — SILDENAFIL CITRATE 100 MG/1
TABLET, FILM COATED ORAL
COMMUNITY
Start: 2018-04-07 | End: 2019-05-24

## 2018-06-08 RX ORDER — ESZOPICLONE 3 MG/1
TABLET, FILM COATED ORAL
Refills: 1 | COMMUNITY
Start: 2018-04-08 | End: 2019-05-24

## 2018-06-08 NOTE — PROGRESS NOTES
Subjective  Caty Kolb is an 48 y.o. male who presents for:    Establishing care. Is [facility] and is also being followed at the [place] for some of his care. Has several specialists he needs referrals to, as below. No acute issues to discuss today. He is wondering if he is on too many medications and is planning to discuss with his specialists who prescribe those various meds. Needs referrals:  Urology-Dr. Wyatt Congress- Dr. Lyndon Davison - reviewed:   No Known Allergies      Medications - reviewed:   Current Outpatient Prescriptions   Medication Sig    oxybutynin chloride XL (DITROPAN XL) 10 mg CR tablet TAKE 1 TABLET BY MOUTH EVERY DAY    pantoprazole (PROTONIX) 40 mg tablet TAKE 1 TABLET BY MOUTH EVERY MORNING 30 MINUTES BEFORE BREAKFAST    VIAGRA 100 mg tablet     topiramate (TOPAMAX) 50 mg tablet Take 1 tab twice a day for migraine prevention    SUMAtriptan (IMITREX) 100 mg tablet 1 tab at onset of severe headache/ migraine. Repeat 1 tab in 2 hours if headache remains. Limit: max 2 days in a week.  albuterol (PROVENTIL HFA, VENTOLIN HFA, PROAIR HFA) 90 mcg/actuation inhaler Take  by inhalation.  acetaminophen (TYLENOL) 325 mg tablet Take  by mouth every four (4) hours as needed for Pain.  montelukast (SINGULAIR) 10 mg tablet Take 10 mg by mouth daily.  cetirizine (ZYRTEC) 10 mg tablet Take  by mouth.  fish oil-omega-3 fatty acids 340-1,000 mg capsule Take 1 Cap by mouth daily.  prazosin (MINIPRESS) 2 mg capsule Take 2 mg by mouth nightly.  fluticasone-salmeterol (ADVAIR DISKUS) 500-50 mcg/dose diskus inhaler Take 1 Puff by inhalation every twelve (12) hours.  fluticasone (FLONASE) 50 mcg/actuation nasal spray 2 Sprays by Both Nostrils route daily.  eszopiclone (LUNESTA) 2 mg tablet Take 3 mg by mouth nightly.  sertraline (ZOLOFT) 100 mg tablet Take  by mouth daily.     tamsulosin (FLOMAX) 0.4 mg capsule Take 0.4 mg by mouth daily.  eszopiclone (LUNESTA) 3 mg tablet TAKE 1 TABLET BY MOUTH AT BEDTIME AS NEEDED FOR SLEEP    QUEtiapine (SEROQUEL) 50 mg tablet TAKE 1 TABLET BY MOUTH AT BEDTIME    gabapentin (NEURONTIN) 300 mg capsule Take 300 mg by mouth. 2 caps PO qhs    omeprazole (PRILOSEC) 20 mg capsule Take 20 mg by mouth daily.  levoFLOXacin (LEVAQUIN) 500 mg tablet Take  by mouth daily.  CELECOXIB (CELEBREX PO) Take  by mouth. No current facility-administered medications for this visit. Past Medical History - reviewed:  Past Medical History:   Diagnosis Date    Asthma     Frequent urination     IBS (irritable bowel syndrome)     Liver disease 2012    Migraine 2006    PTSD (post-traumatic stress disorder) 2017         Past Surgical History - reviewed:   Past Surgical History:   Procedure Laterality Date    HX OTHER SURGICAL  2012    left hand tendon repair         Social History - reviewed:  Social History     Social History    Marital status:      Spouse name: N/A    Number of children: N/A    Years of education: N/A     Occupational History    Not on file.      Social History Main Topics    Smoking status: Never Smoker    Smokeless tobacco: Former User     Quit date: 6/8/2008    Alcohol use No    Drug use: Not on file    Sexual activity: Not on file     Other Topics Concern    Not on file     Social History Narrative         Family History - reviewed:  Family History   Problem Relation Age of Onset    Cancer Mother      colon    Cancer Father      colon         ROS  CONSTITUTIONAL: Denies: fever, chills  PSYCH: anxiety, depression      Physical Exam  Visit Vitals    /68 (BP 1 Location: Left arm, BP Patient Position: Sitting)    Pulse 71    Temp 98.2 °F (36.8 °C)    Resp 16    Ht 5' 11\" (1.803 m)    Wt 187 lb (84.8 kg)    SpO2 98%    BMI 26.08 kg/m2       General appearance - alert, well appearing, and in no distress  Eyes - pupils equal and reactive, extraocular eye movements intact  Ears - bilateral TM's and external ear canals normal  Nose - normal and patent, no erythema, discharge or polyps  Mouth - mucous membranes moist, pharynx normal without lesions  Neck - supple, no significant adenopathy  Chest - clear to auscultation, no wheezes, rales or rhonchi, symmetric air entry  Heart - normal rate, regular rhythm, normal S1, S2, no murmurs, rubs, clicks or gallops  Abdomen - soft, nontender, nondistended, no masses or organomegaly  Neurological - alert, oriented, normal speech, no focal findings or movement disorder noted  Musculoskeletal - no joint tenderness, deformity or swelling  Extremities - peripheral pulses normal, no pedal edema, no clubbing or cyanosis  Skin - normal coloration and turgor, no rashes, no suspicious skin lesions noted  Psych - normal mood and affect       Assessment/Plan    ICD-10-CM ICD-9-CM    1. Frequency of urination R35.0 788.41 REFERRAL TO UROLOGY   2. Irritable bowel syndrome, unspecified type K58.9 564.1 REFERRAL TO GASTROENTEROLOGY   3. Asthma, unspecified asthma severity, unspecified whether complicated, unspecified whether persistent J45.909 493.90 REFERRAL TO PULMONARY DISEASE   4. Migraine without aura and without status migrainosus, not intractable G43.009 346.10 REFERRAL TO NEUROLOGY   5. Establishing care with new doctor, encounter for Z76.89 V65.8      Referrals given per pt request as above. He is also followed by the [place] for some of his care. Recommend yearly well male exam.        I have discussed the diagnosis with the patient and the intended plan as seen in the above orders. The patient has received an after-visit summary and questions were answered concerning future plans. I have discussed medication side effects and warnings with the patient as well.       Shakeel Skelton, DO

## 2018-06-08 NOTE — MR AVS SNAPSHOT
[address] 
[phone] Patient: Rob Jacob MRN: [id number] Visit Information Date & Time Provider Department Dept. Phone Encounter #  
 6/8/2018  1:30 PM Shree Moreno DO  [facility] [phone] [id number] Upcoming Health Maintenance Date Due Hepatitis C Screening 1965 DTaP/Tdap/Td series (1 - Tdap) 3/4/1986 FOBT Q 1 YEAR AGE 50-75 3/4/2015 Influenza Age 5 to Adult 8/1/2018 Allergies as of 6/8/2018  Review Complete On: 6/8/2018 By: Shree Moreno DO No Known Allergies Current Immunizations  Never Reviewed No immunizations on file. Not reviewed this visit Vitals BP Pulse Temp Resp Height(growth percentile) Weight(growth percentile) 110/68 (BP 1 Location: Left arm, BP Patient Position: Sitting) 71 98.2 °F (36.8 °C) 16 5' 11\" (1.803 m) 187 lb (84.8 kg) SpO2 BMI Smoking Status 98% 26.08 kg/m2 Never Smoker Vitals History BMI and BSA Data Body Mass Index Body Surface Area 26.08 kg/m 2 2.06 m 2 Preferred Pharmacy Pharmacy Name Phone [id number] - [address] [facility], [address] [facility] [phone] Your Updated Medication List  
  
   
This list is accurate as of 6/8/18  4:34 PM.  Always use your most recent med list.  
  
  
  
  
 acetaminophen 325 mg tablet Commonly known as:  TYLENOL Take  by mouth every four (4) hours as needed for Pain. ADVAIR DISKUS 500-50 mcg/dose diskus inhaler Generic drug:  fluticasone-salmeterol Take 1 Puff by inhalation every twelve (12) hours. albuterol 90 mcg/actuation inhaler Commonly known as:  PROVENTIL HFA, VENTOLIN HFA, PROAIR HFA Take  by inhalation. CELEBREX PO Take  by mouth. cetirizine 10 mg tablet Commonly known as:  ZYRTEC Take  by mouth. fish oil-omega-3 fatty acids 340-1,000 mg capsule Take 1 Cap by mouth daily. FLOMAX 0.4 mg capsule Generic drug:  tamsulosin Take 0.4 mg by mouth daily. fluticasone 50 mcg/actuation nasal spray Commonly known as:  Lovetta End 2 Sprays by Both Nostrils route daily. gabapentin 300 mg capsule Commonly known as:  NEURONTIN Take 300 mg by mouth. 2 caps PO qhs  
  
 LEVAQUIN 500 mg tablet Generic drug:  levoFLOXacin Take  by mouth daily. * LUNESTA 2 mg tablet Generic drug:  eszopiclone Take 3 mg by mouth nightly. * eszopiclone 3 mg tablet Commonly known as:  Denisse Darius TAKE 1 TABLET BY MOUTH AT BEDTIME AS NEEDED FOR SLEEP  
  
 montelukast 10 mg tablet Commonly known as:  SINGULAIR Take 10 mg by mouth daily. omeprazole 20 mg capsule Commonly known as:  PRILOSEC Take 20 mg by mouth daily. oxybutynin chloride XL 10 mg CR tablet Commonly known as:  DITROPAN XL  
TAKE 1 TABLET BY MOUTH EVERY DAY  
  
 pantoprazole 40 mg tablet Commonly known as:  PROTONIX  
TAKE 1 TABLET BY MOUTH EVERY MORNING 30 MINUTES BEFORE BREAKFAST  
  
 prazosin 2 mg capsule Commonly known as:  MINIPRESS Take 2 mg by mouth nightly. QUEtiapine 50 mg tablet Commonly known as:  SEROquel TAKE 1 TABLET BY MOUTH AT BEDTIME  
  
 SUMAtriptan 100 mg tablet Commonly known as:  IMITREX  
1 tab at onset of severe headache/ migraine. Repeat 1 tab in 2 hours if headache remains. Limit: max 2 days in a week. topiramate 50 mg tablet Commonly known as:  TOPAMAX Take 1 tab twice a day for migraine prevention VIAGRA 100 mg tablet Generic drug:  sildenafil citrate ZOLOFT 100 mg tablet Generic drug:  sertraline Take  by mouth daily. * Notice: This list has 2 medication(s) that are the same as other medications prescribed for you. Read the directions carefully, and ask your doctor or other care provider to review them with you. Introducing [facility] & HEALTH SERVICES! Darline Amber introduces [employer] patient portal. Now you can access parts of your medical record, email your doctor's office, and request medication refills online. 1. In your internet browser, go to [email or web address] 2. Click on the First Time User? Click Here link in the Sign In box. You will see the New Member Sign Up page. 3. Enter your Big[employer]Fi Access Code exactly as it appears below. You will not need to use this code after youve completed the sign-up process. If you do not sign up before the expiration date, you must request a new code. · Big[employer]Fi Access Code: [email or web address] Expires: 7/25/2018 12:10 PM 
 
4. Enter the last four digits of your Social Security Number (xxxx) and Date of Birth (mm/dd/yyyy) as indicated and click Submit. You will be taken to the next sign-up page. 5. Create a BiggiFi ID. This will be your [employer] login ID and cannot be changed, so think of one that is secure and easy to remember. 6. Create a Big[employer]Fi password. You can change your password at any time. 7. Enter your Password Reset Question and Answer. This can be used at a later time if you forget your password. 8. Enter your e-mail address. You will receive e-mail notification when new information is available in [address] 9. Click Sign Up. You can now view and download portions of your medical record. 10. Click the Download Summary menu link to download a portable copy of your medical information. If you have questions, please visit the Frequently Asked Questions section of the [employer] website. Remember, [employer]Fi is NOT to be used for urgent needs. For medical emergencies, dial 911. Now available from your iPhone and Android! Please provide this summary of care documentation to your next provider. Your primary care clinician is listed as Deloris Durbin. If you have any questions after today's visit, please call [phone].

## 2018-06-08 NOTE — PROGRESS NOTES
Chief Complaint   Patient presents with   [facility] Establish Care     1. Have you been to the ER, urgent care clinic since your last visit? Hospitalized since your last visit? No    2. Have you seen or consulted any other health care providers outside of the [address] [place] [address] since your last visit? Include any pap smears or colon screening.  Yes  Urology-Dr. Penelope Whitaker- Dr. Mark Sanders

## 2018-06-14 ENCOUNTER — TELEPHONE (OUTPATIENT)
Dept: FAMILY MEDICINE CLINIC | Age: 53
End: 2018-06-14

## 2018-06-14 NOTE — TELEPHONE ENCOUNTER
----- Message from [employer] sent at 6/14/2018  2:19 PM EDT -----  Regarding: Dr. Bonita JENKINS H/[phone]    Pt is requesting a referral for Dr. Lauri Sharp with [facility] [phone] for his scheduled appt on Monday, 06/18/18 at 8:30AM.    Pt was seen on last Friday, 06/08/18. Pt would like a call back advising the status.

## 2018-06-14 NOTE — TELEPHONE ENCOUNTER
Referral order in chart does not look complete as does not include a diagnosis code.     Referral on 6/6/2018   Referred by Referred to   DO Brennon Grimaldo MD - Urology

## 2018-06-15 ENCOUNTER — TELEPHONE (OUTPATIENT)
Dept: FAMILY MEDICINE CLINIC | Age: 53
End: 2018-06-15

## 2018-06-15 NOTE — TELEPHONE ENCOUNTER
Dr Jerson Adhikari:    Can you help me please. ... All referral orders that you entered on this patient is not complete. ... Can you complete them? ??? Patient has already been seen on 6/8/18 by you & has an upcoming appointment with Dr Lashay Henry (urol) on Monday. ... Need this done by 12 pm today or patient will have to cancel his appointment. ... Any questions call me. ...     Thanks   zSoup B

## 2018-06-19 NOTE — TELEPHONE ENCOUNTER
Roxy:   See message below, sent by Dr Toby Medeiros.... Look under the referral tab to see the 3 incomplete referral she started. ... Please complete them or enter new ones, which ever is easier. ... Call me with questions. ... Thanks  Ana Rosa Skelton, DO Ana Rosa Giang       Caller: Unspecified (4 days ago,  9:07 AM)                     Ana Rosa, I wasn't working the day you sent this to me.  Did my nurse Louis Mayberry address it? Mary Beth Marie know if you still need the referral, as I'm out of the office most of this week too. Miller Fischer!      MVest

## 2018-06-28 ENCOUNTER — TELEPHONE (OUTPATIENT)
Dept: FAMILY MEDICINE CLINIC | Age: 53
End: 2018-06-28

## 2018-06-28 NOTE — TELEPHONE ENCOUNTER
Patient has an appointment scheduled for 8/16/18 at 9:15am with Dr. Andrea Flor and will need a referral.

## 2018-07-11 DIAGNOSIS — G44.309 POST-CONCUSSION HEADACHE: ICD-10-CM

## 2018-07-11 DIAGNOSIS — G43.009 MIGRAINE WITHOUT AURA AND WITHOUT STATUS MIGRAINOSUS, NOT INTRACTABLE: ICD-10-CM

## 2018-07-11 RX ORDER — TOPIRAMATE 50 MG/1
TABLET, FILM COATED ORAL
Qty: 180 TAB | Refills: 2 | Status: CANCELLED | OUTPATIENT
Start: 2018-07-11

## 2018-07-11 NOTE — TELEPHONE ENCOUNTER
----- Message from [facility] sent at 7/11/2018  8:45 AM EDT -----  Regarding: Dr. Raul Adams [phone] needs a refill on topamax called into [facility] [phone].

## 2018-07-12 NOTE — TELEPHONE ENCOUNTER
Contacted patient and informed him a 90 day supply of topamax was sent to his pharmacy in April 2018 with 2 refills. Advised him to contact pharm and request refill. He voiced understanding and will call back if any further questions or concerns.

## 2019-02-22 DIAGNOSIS — G43.009 MIGRAINE WITHOUT AURA AND WITHOUT STATUS MIGRAINOSUS, NOT INTRACTABLE: ICD-10-CM

## 2019-02-22 RX ORDER — SUMATRIPTAN 100 MG/1
TABLET, FILM COATED ORAL
Qty: 9 TAB | Refills: 1 | Status: SHIPPED | OUTPATIENT
Start: 2019-02-22 | End: 2019-04-05

## 2019-02-22 NOTE — TELEPHONE ENCOUNTER
----- Message from Margot Adames sent at 2/22/2019  2:50 PM EST -----  Regarding: Dr Nesbitt/rx refill  Pt (p) [phone], requesting a rx  refill for his  Sumatriptan medication, and will need it called into  The [address]   [phone], or may have to be e scribed, he said his headaches have been getting worse, and hopes it can be called in today     Please call if there are any questions please feel free to call, and can leave a voice mail message if he misses your call.

## 2019-03-19 ENCOUNTER — TELEPHONE (OUTPATIENT)
Dept: FAMILY MEDICINE CLINIC | Age: 54
End: 2019-03-19

## 2019-03-19 NOTE — TELEPHONE ENCOUNTER
appt is 3/26/19 with below doctor.  Referral is needed      Margarita Linares   Physician   Primary Contact Information     Phone Fax E-mail Address   [phone] Not available [phone]

## 2019-03-21 NOTE — TELEPHONE ENCOUNTER
Dr. Shoaib Desouza   Received: Today   Message Contents   [place], [address] [facility],[address]             Pt is requesting a call back regarding if his referral was sent to [facility] Dr. Bebe Bernstein. Best contact is [phone].

## 2019-03-23 ENCOUNTER — APPOINTMENT (OUTPATIENT)
Dept: CT IMAGING | Age: 54
End: 2019-03-23
Attending: EMERGENCY MEDICINE
Payer: OTHER GOVERNMENT

## 2019-03-23 ENCOUNTER — HOSPITAL ENCOUNTER (EMERGENCY)
Age: 54
Discharge: HOME | End: 2019-03-23
Attending: EMERGENCY MEDICINE
Payer: OTHER GOVERNMENT

## 2019-03-23 VITALS
HEIGHT: 71 IN | OXYGEN SATURATION: 100 % | DIASTOLIC BLOOD PRESSURE: 92 MMHG | WEIGHT: 215 LBS | HEART RATE: 57 BPM | BODY MASS INDEX: 30.1 KG/M2 | RESPIRATION RATE: 18 BRPM | TEMPERATURE: 98 F | SYSTOLIC BLOOD PRESSURE: 129 MMHG

## 2019-03-23 DIAGNOSIS — R10.32 ABDOMINAL PAIN, LLQ (LEFT LOWER QUADRANT): Primary | ICD-10-CM

## 2019-03-23 LAB
ALBUMIN SERPL-MCNC: 3.6 G/DL (ref 3.5–5)
ALBUMIN/GLOB SERPL: 1 {RATIO} (ref 1.1–2.2)
ALP SERPL-CCNC: 64 U/L (ref 45–117)
ALT SERPL-CCNC: 124 U/L (ref 12–78)
ANION GAP SERPL CALC-SCNC: 5 MMOL/L (ref 5–15)
APPEARANCE UR: CLEAR
AST SERPL-CCNC: 77 U/L (ref 15–37)
BACTERIA URNS QL MICRO: NEGATIVE /HPF
BASOPHILS # BLD: 0 K/UL (ref 0–0.1)
BASOPHILS NFR BLD: 1 % (ref 0–1)
BILIRUB SERPL-MCNC: 0.3 MG/DL (ref 0.2–1)
BILIRUB UR QL: NEGATIVE
BUN SERPL-MCNC: 17 MG/DL (ref 6–20)
BUN/CREAT SERPL: 17 (ref 12–20)
CALCIUM SERPL-MCNC: 8.6 MG/DL (ref 8.5–10.1)
CHLORIDE SERPL-SCNC: 108 MMOL/L (ref 97–108)
CO2 SERPL-SCNC: 28 MMOL/L (ref 21–32)
COLOR UR: NORMAL
COMMENT, HOLDF: NORMAL
CREAT SERPL-MCNC: 1.01 MG/DL (ref 0.7–1.3)
DIFFERENTIAL METHOD BLD: ABNORMAL
EOSINOPHIL # BLD: 0.1 K/UL (ref 0–0.4)
EOSINOPHIL NFR BLD: 2 % (ref 0–7)
EPITH CASTS URNS QL MICRO: NORMAL /LPF
ERYTHROCYTE [DISTWIDTH] IN BLOOD BY AUTOMATED COUNT: 12.3 % (ref 11.5–14.5)
GLOBULIN SER CALC-MCNC: 3.7 G/DL (ref 2–4)
GLUCOSE SERPL-MCNC: 87 MG/DL (ref 65–100)
GLUCOSE UR STRIP.AUTO-MCNC: NEGATIVE MG/DL
HCT VFR BLD AUTO: 41.7 % (ref 36.6–50.3)
HGB BLD-MCNC: 14 G/DL (ref 12.1–17)
HGB UR QL STRIP: NEGATIVE
HYALINE CASTS URNS QL MICRO: NORMAL /LPF (ref 0–5)
IMM GRANULOCYTES # BLD AUTO: 0 K/UL (ref 0–0.04)
IMM GRANULOCYTES NFR BLD AUTO: 1 % (ref 0–0.5)
KETONES UR QL STRIP.AUTO: NEGATIVE MG/DL
LEUKOCYTE ESTERASE UR QL STRIP.AUTO: NEGATIVE
LIPASE SERPL-CCNC: 69 U/L (ref 73–393)
LYMPHOCYTES # BLD: 1.7 K/UL (ref 0.8–3.5)
LYMPHOCYTES NFR BLD: 29 % (ref 12–49)
MCH RBC QN AUTO: 31 PG (ref 26–34)
MCHC RBC AUTO-ENTMCNC: 33.6 G/DL (ref 30–36.5)
MCV RBC AUTO: 92.5 FL (ref 80–99)
MONOCYTES # BLD: 0.5 K/UL (ref 0–1)
MONOCYTES NFR BLD: 8 % (ref 5–13)
NEUTS SEG # BLD: 3.6 K/UL (ref 1.8–8)
NEUTS SEG NFR BLD: 59 % (ref 32–75)
NITRITE UR QL STRIP.AUTO: NEGATIVE
NRBC # BLD: 0 K/UL (ref 0–0.01)
NRBC BLD-RTO: 0 PER 100 WBC
PH UR STRIP: 7 [PH] (ref 5–8)
PLATELET # BLD AUTO: 230 K/UL (ref 150–400)
PMV BLD AUTO: 8.7 FL (ref 8.9–12.9)
POTASSIUM SERPL-SCNC: 3.8 MMOL/L (ref 3.5–5.1)
PROT SERPL-MCNC: 7.3 G/DL (ref 6.4–8.2)
PROT UR STRIP-MCNC: NEGATIVE MG/DL
RBC # BLD AUTO: 4.51 M/UL (ref 4.1–5.7)
RBC #/AREA URNS HPF: NORMAL /HPF (ref 0–5)
SAMPLES BEING HELD,HOLD: NORMAL
SODIUM SERPL-SCNC: 141 MMOL/L (ref 136–145)
SP GR UR REFRACTOMETRY: 1.01 (ref 1–1.03)
UR CULT HOLD, URHOLD: NORMAL
UROBILINOGEN UR QL STRIP.AUTO: 0.2 EU/DL (ref 0.2–1)
WBC # BLD AUTO: 5.9 K/UL (ref 4.1–11.1)
WBC URNS QL MICRO: NORMAL /HPF (ref 0–4)

## 2019-03-23 PROCEDURE — 74177 CT ABD & PELVIS W/CONTRAST: CPT

## 2019-03-23 PROCEDURE — 74011250636 HC RX REV CODE- 250/636: Performed by: EMERGENCY MEDICINE

## 2019-03-23 PROCEDURE — 83690 ASSAY OF LIPASE: CPT

## 2019-03-23 PROCEDURE — 80053 COMPREHEN METABOLIC PANEL: CPT

## 2019-03-23 PROCEDURE — 81001 URINALYSIS AUTO W/SCOPE: CPT

## 2019-03-23 PROCEDURE — 96361 HYDRATE IV INFUSION ADD-ON: CPT

## 2019-03-23 PROCEDURE — 96374 THER/PROPH/DIAG INJ IV PUSH: CPT

## 2019-03-23 PROCEDURE — 85025 COMPLETE CBC W/AUTO DIFF WBC: CPT

## 2019-03-23 PROCEDURE — 99285 EMERGENCY DEPT VISIT HI MDM: CPT

## 2019-03-23 PROCEDURE — 74011636320 HC RX REV CODE- 636/320: Performed by: RADIOLOGY

## 2019-03-23 PROCEDURE — 96375 TX/PRO/DX INJ NEW DRUG ADDON: CPT

## 2019-03-23 PROCEDURE — 36415 COLL VENOUS BLD VENIPUNCTURE: CPT

## 2019-03-23 RX ORDER — HYDROMORPHONE HYDROCHLORIDE 2 MG/ML
0.5 INJECTION, SOLUTION INTRAMUSCULAR; INTRAVENOUS; SUBCUTANEOUS
Status: COMPLETED | OUTPATIENT
Start: 2019-03-23 | End: 2019-03-23

## 2019-03-23 RX ORDER — ONDANSETRON 2 MG/ML
4 INJECTION INTRAMUSCULAR; INTRAVENOUS
Status: COMPLETED | OUTPATIENT
Start: 2019-03-23 | End: 2019-03-23

## 2019-03-23 RX ORDER — KETOROLAC TROMETHAMINE 30 MG/ML
30 INJECTION, SOLUTION INTRAMUSCULAR; INTRAVENOUS
Status: COMPLETED | OUTPATIENT
Start: 2019-03-23 | End: 2019-03-23

## 2019-03-23 RX ADMIN — SODIUM CHLORIDE 1000 ML: 900 INJECTION, SOLUTION INTRAVENOUS at 20:21

## 2019-03-23 RX ADMIN — HYDROMORPHONE HYDROCHLORIDE 0.5 MG: 2 INJECTION INTRAMUSCULAR; INTRAVENOUS; SUBCUTANEOUS at 21:56

## 2019-03-23 RX ADMIN — ONDANSETRON 4 MG: 2 INJECTION INTRAMUSCULAR; INTRAVENOUS at 20:37

## 2019-03-23 RX ADMIN — KETOROLAC TROMETHAMINE 30 MG: 30 INJECTION, SOLUTION INTRAMUSCULAR; INTRAVENOUS at 20:21

## 2019-03-23 RX ADMIN — IOPAMIDOL 100 ML: 755 INJECTION, SOLUTION INTRAVENOUS at 21:20

## 2019-03-23 NOTE — ED TRIAGE NOTES
Patient reports having \"butterflys in stomach\" x 1 month, states for two weeks it has become cramping. In the last week the cramping has become worse, went to the [place] ER last Friday FU on Thursday and was given cipro and flagyl for possible diverticulitis and told to come to ER if it got worse. Today patient states the pain is now sharp and in LLQ.

## 2019-03-24 NOTE — ED NOTES
Verbal shift change report given to Ary (oncoming nurse) by Patricia Whaley RN student, [address] (offgoing nurse). Report included the following information SBAR, ED Summary, MAR, Recent Results and Cardiac Rhythm Sinus bradycardia.

## 2019-03-24 NOTE — ED PROVIDER NOTES
Rob Nguyen is a 46 yo M recently retired from [employer] who presents to the ED with LLQ abdominal pain. He states that he first noticed the pain about 1 month ago and it felt like \"butterflies\" but fore the past 2 weeks it has become more intense. It is focused in his LLQ. Nothing makes it better or worse. He was seen in the [place] clinic 2 days ago and was diagnosed with possible diverticulitis. He was prescribed cipro and flagyl but states that he was told to stop taking the flagyl if it felt like it was not helping. He states that he is only taking the cipro not but is not feeling better. He called the clinic back and was told that they would order and outpatient CT. He has an appointment with his GI on Tuesday but the pain is getting worse and he does not think that he should wait. He denies fever. He has had mild nausea but no vomiting. His pain is 6/10. He has not taken anything for pain today. Past Medical History:  
Diagnosis Date  Asthma  Frequent urination  IBS (irritable bowel syndrome)  Liver disease 2012  Migraine 2006  PTSD (post-traumatic stress disorder) 2017 Past Surgical History:  
Procedure Laterality Date  HX OTHER SURGICAL  2012  
 left hand tendon repair Family History:  
Problem Relation Age of Onset  Cancer Mother   
     colon  Cancer Father   
     colon Social History Socioeconomic History  Marital status:  Spouse name: Not on file  Number of children: Not on file  Years of education: Not on file  Highest education level: Not on file Occupational History  Not on file Social Needs  Financial resource strain: Not on file  Food insecurity:  
  Worry: Not on file Inability: Not on file  Transportation needs:  
  Medical: Not on file Non-medical: Not on file Tobacco Use  Smoking status: Never Smoker  Smokeless tobacco: Former User Substance and Sexual Activity  Alcohol use: No  
 Drug use: Not on file  Sexual activity: Not on file Lifestyle  Physical activity:  
  Days per week: Not on file Minutes per session: Not on file  Stress: Not on file Relationships  Social connections:  
  Talks on phone: Not on file Gets together: Not on file Attends Temple service: Not on file Active member of club or organization: Not on file Attends meetings of clubs or organizations: Not on file Relationship status: Not on file  Intimate partner violence:  
  Fear of current or ex partner: Not on file Emotionally abused: Not on file Physically abused: Not on file Forced sexual activity: Not on file Other Topics Concern  Not on file Social History Narrative  Not on file ALLERGIES: Patient has no known allergies. Review of Systems Constitutional: Negative for fever. HENT: Negative for sore throat. Eyes: Negative for visual disturbance. Respiratory: Negative for cough. Cardiovascular: Negative for chest pain. Gastrointestinal: Positive for abdominal pain and nausea. Negative for vomiting. Genitourinary: Negative for dysuria. Musculoskeletal: Negative for back pain. Skin: Negative for rash. Neurological: Negative for headaches. Vitals:  
 03/23/19 1948 BP: (!) 142/95 Pulse: (!) 57 Resp: 18 Temp: 98 °F (36.7 °C) SpO2: 98% Weight: 97.5 kg (215 lb) Height: 5' 11\" (1.803 m) Physical Exam  
Constitutional: He appears well-developed and well-nourished. No distress. HENT:  
Head: Normocephalic and atraumatic. Mouth/Throat: Oropharynx is clear and moist.  
Eyes: Conjunctivae and EOM are normal.  
Neck: Normal range of motion and phonation normal.  
Cardiovascular: Normal rate. Pulmonary/Chest: Effort normal. No respiratory distress. Abdominal: He exhibits no distension.  There is tenderness in the left lower quadrant. There is negative Shoemaker's sign. Musculoskeletal: Normal range of motion. He exhibits no tenderness. Neurological: He is alert. He is not disoriented. He exhibits normal muscle tone. Skin: Skin is warm and dry. Nursing note and vitals reviewed. MDM 
  
10:07 PM 
Patient reassessed and pain is somewhat improved after toradol and hydromorphone. Labs and CT abd/pelvis with no acute findings. Diverticulosis was identified but no diverticulitis. CT otherwise normal.  Will discharge home. Advised patient to follow-up with his GI as previously scheduled. Procedures

## 2019-03-24 NOTE — ED NOTES
Patient discharged from ED by provider. Discharge instructions reviewed with patient. Patient ambulatory from ED in [facility].

## 2019-03-24 NOTE — DISCHARGE INSTRUCTIONS

## 2019-04-05 ENCOUNTER — OFFICE VISIT (OUTPATIENT)
Dept: NEUROLOGY | Age: 54
End: 2019-04-05

## 2019-04-05 VITALS
HEIGHT: 71 IN | DIASTOLIC BLOOD PRESSURE: 82 MMHG | WEIGHT: 215 LBS | BODY MASS INDEX: 30.1 KG/M2 | SYSTOLIC BLOOD PRESSURE: 112 MMHG

## 2019-04-05 DIAGNOSIS — G43.009 MIGRAINE WITHOUT AURA AND WITHOUT STATUS MIGRAINOSUS, NOT INTRACTABLE: ICD-10-CM

## 2019-04-05 DIAGNOSIS — G44.219 EPISODIC TENSION-TYPE HEADACHE, NOT INTRACTABLE: Primary | ICD-10-CM

## 2019-04-05 RX ORDER — RIBOFLAVIN (VITAMIN B2) 100 MG
100 TABLET ORAL 2 TIMES DAILY
Qty: 60 TAB | Refills: 6 | Status: SHIPPED | OUTPATIENT
Start: 2019-04-05

## 2019-04-05 RX ORDER — SUMATRIPTAN 100 MG/1
TABLET, FILM COATED ORAL
Qty: 9 TAB | Refills: 4 | Status: SHIPPED | OUTPATIENT
Start: 2019-04-05 | End: 2019-05-24

## 2019-04-05 NOTE — PROGRESS NOTES
Interval HPI:   This is a 47 y.o. male who is following up for     Chief Complaint   Patient presents with    Migraine       Last visit, April 2018, pt continued to have significant reduction in headache frequency (2-3 HA days a week and 2-3 migraine days a month, was 1 migraine day a week) since being on Topamax 50 mg BID  so that was continued, and Sumatriptan 100 mg tablets would knock out migraine when he used. Sometime around January 2019 headaches became more frequent (daily) and says migraine was 5 days a week. Pt wasn't sure what was causing it, so he decided to stop Topamax and noticed those headache were reduced to 3 days a week and maybe one day a week was migraine. About a month ago, he started having abdominal discomfort/ cramping and has plans to have scope. Along the same timeframe, having some tightness in neck and sides of head, \"like a vice,\" he says different from his migraines. Says his mood issues/ PTSD are better, not worse. Psychiatry meds same for about a year. Tried Amitriptyline but aggravated his PTSD     Brief ROS: as above or otherwise negative      No Known Allergies  Current Outpatient Medications   Medication Sig Dispense Refill    riboflavin, vitamin B2, (VITAMIN B-2) 100 mg tablet Take 1 Tab by mouth two (2) times a day. To reduce headache frequency 60 Tab 6    SUMAtriptan (IMITREX) 100 mg tablet 1 tab at onset of severe headache/ migraine. Repeat 1 tab in 2 hours if headache remains. Limit: max 2 days in a week. 9 Tab 4    eszopiclone (LUNESTA) 3 mg tablet TAKE 1 TABLET BY MOUTH AT BEDTIME AS NEEDED FOR SLEEP  1    VIAGRA 100 mg tablet       QUEtiapine (SEROQUEL) 50 mg tablet TAKE 1 TABLET BY MOUTH AT BEDTIME  1    albuterol (PROVENTIL HFA, VENTOLIN HFA, PROAIR HFA) 90 mcg/actuation inhaler Take  by inhalation.  acetaminophen (TYLENOL) 325 mg tablet Take  by mouth every four (4) hours as needed for Pain.       montelukast (SINGULAIR) 10 mg tablet Take 10 mg by mouth daily.  cetirizine (ZYRTEC) 10 mg tablet Take  by mouth.  fluticasone-salmeterol (ADVAIR DISKUS) 500-50 mcg/dose diskus inhaler Take 1 Puff by inhalation every twelve (12) hours.  fluticasone (FLONASE) 50 mcg/actuation nasal spray 2 Sprays by Both Nostrils route daily.  sertraline (ZOLOFT) 100 mg tablet Take  by mouth daily.  tamsulosin (FLOMAX) 0.4 mg capsule Take 0.4 mg by mouth daily.  oxybutynin chloride XL (DITROPAN XL) 10 mg CR tablet TAKE 1 TABLET BY MOUTH EVERY DAY  3    pantoprazole (PROTONIX) 40 mg tablet TAKE 1 TABLET BY MOUTH EVERY MORNING 30 MINUTES BEFORE BREAKFAST  1    fish oil-omega-3 fatty acids 340-1,000 mg capsule Take 1 Cap by mouth daily.  prazosin (MINIPRESS) 2 mg capsule Take 2 mg by mouth nightly.  gabapentin (NEURONTIN) 300 mg capsule Take 300 mg by mouth. 2 caps PO qhs      omeprazole (PRILOSEC) 20 mg capsule Take 20 mg by mouth daily.  levoFLOXacin (LEVAQUIN) 500 mg tablet Take  by mouth daily.  eszopiclone (LUNESTA) 2 mg tablet Take 3 mg by mouth nightly.  CELECOXIB (CELEBREX PO) Take  by mouth. Physical Exam  Blood pressure 112/82, height 5' 11\" (1.803 m), weight 97.5 kg (215 lb). No acute distress  Neck: no stiffness  Skin: no rashes    Focused Neurological Exam     Mental status: Alert and oriented to person, place situation. Language: normal fluency and comprehension; no dysarthria. CNs:   Visual fields grossly normal  Extraocular movements intact, no nystagmus  Face appears symmetric and facial strength normal.    Hearing is intact to casual conversation. Sensory: intact light touch in arms and legs  Motor: Normal bulk and strength in all 4 extremities. Reflexes: deferred  Gait: normal    Impression      ICD-10-CM ICD-9-CM    1. Episodic tension-type headache, not intractable G44.219 339.11 riboflavin, vitamin B2, (VITAMIN B-2) 100 mg tablet   2.  Migraine without aura and without status migrainosus, not intractable G43.009 346.10 SUMAtriptan (IMITREX) 100 mg tablet       D/w patient that headache pattern is more suggestive of Tension HA than migraine. 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 He doesn't want to try another medication for headache prevention but was open to trying Riboflavin/ Vit B2 as a headache preventive and continuing to use Sumatriptan 100 mg tabs prn migraine (he says limiting use to one day a week). Rx'd Riboflavin one tab BID and Renewed Rx Sumatriptan tabs.     Follow up in 6 months

## 2019-05-14 ENCOUNTER — HOSPITAL ENCOUNTER (EMERGENCY)
Age: 54
Discharge: HOME | End: 2019-05-14
Attending: EMERGENCY MEDICINE
Payer: OTHER GOVERNMENT

## 2019-05-14 VITALS
HEIGHT: 71 IN | SYSTOLIC BLOOD PRESSURE: 117 MMHG | OXYGEN SATURATION: 98 % | HEART RATE: 66 BPM | TEMPERATURE: 98.4 F | WEIGHT: 215 LBS | RESPIRATION RATE: 16 BRPM | BODY MASS INDEX: 30.1 KG/M2 | DIASTOLIC BLOOD PRESSURE: 78 MMHG

## 2019-05-14 DIAGNOSIS — S61.210A LACERATION OF RIGHT INDEX FINGER WITHOUT DAMAGE TO NAIL, FOREIGN BODY PRESENCE UNSPECIFIED, INITIAL ENCOUNTER: Primary | ICD-10-CM

## 2019-05-14 PROCEDURE — 75810000293 HC SIMP/SUPERF WND  RPR

## 2019-05-14 PROCEDURE — 77030002986 HC SUT PROL J&J -A

## 2019-05-14 PROCEDURE — 99282 EMERGENCY DEPT VISIT SF MDM: CPT

## 2019-05-14 PROCEDURE — 74011250636 HC RX REV CODE- 250/636: Performed by: NURSE PRACTITIONER

## 2019-05-14 PROCEDURE — 74011000250 HC RX REV CODE- 250: Performed by: NURSE PRACTITIONER

## 2019-05-14 RX ORDER — LIDOCAINE HYDROCHLORIDE 10 MG/ML
10 INJECTION, SOLUTION EPIDURAL; INFILTRATION; INTRACAUDAL; PERINEURAL ONCE
Status: COMPLETED | OUTPATIENT
Start: 2019-05-14 | End: 2019-05-14

## 2019-05-14 RX ORDER — BACITRACIN 500 [USP'U]/G
OINTMENT TOPICAL 3 TIMES DAILY
Qty: 1 TUBE | Refills: 0 | Status: SHIPPED | OUTPATIENT
Start: 2019-05-14 | End: 2019-05-24

## 2019-05-14 RX ORDER — BACITRACIN 500 UNIT/G
1 PACKET (EA) TOPICAL ONCE
Status: COMPLETED | OUTPATIENT
Start: 2019-05-14 | End: 2019-05-14

## 2019-05-14 RX ADMIN — BACITRACIN 1 PACKET: 500 OINTMENT TOPICAL at 19:42

## 2019-05-14 RX ADMIN — LIDOCAINE HYDROCHLORIDE 10 ML: 10 INJECTION, SOLUTION EPIDURAL; INFILTRATION; INTRACAUDAL; PERINEURAL at 19:42

## 2019-05-14 NOTE — ED PROVIDER NOTES
The patient is a 14-year-old male without significant past medical history significant who is ambulatory to the ER today with complaints of right index finger laceration. Patient states he was working in his chart with a sledgehammer when the handle slipped from his hand cutting the dorsal surface of the right index finger. The wound is currently hemostatic. Patient states his tetanus is up-to-date in the last 2 years. Patient has no further complaints at this time. Primary care provider:Shakeel Skelton DO The history is provided by the patient. No  was used. Past Medical History:  
Diagnosis Date  Asthma  Frequent urination  IBS (irritable bowel syndrome)  Liver disease 2012  Migraine 2006  PTSD (post-traumatic stress disorder) 2017 Past Surgical History:  
Procedure Laterality Date  HX OTHER SURGICAL  2012  
 left hand tendon repair Family History:  
Problem Relation Age of Onset  Cancer Mother   
     colon  Cancer Father   
     colon Social History Socioeconomic History  Marital status:  Spouse name: Not on file  Number of children: Not on file  Years of education: Not on file  Highest education level: Not on file Occupational History  Not on file Social Needs  Financial resource strain: Not on file  Food insecurity:  
  Worry: Not on file Inability: Not on file  Transportation needs:  
  Medical: Not on file Non-medical: Not on file Tobacco Use  Smoking status: Never Smoker  Smokeless tobacco: Former User Substance and Sexual Activity  Alcohol use: Not Currently Comment: hasnt had a drink since sept 2017  Drug use: Never  Sexual activity: Not on file Lifestyle  Physical activity:  
  Days per week: Not on file Minutes per session: Not on file  Stress: Not on file Relationships  Social connections:  
  Talks on phone: Not on file Gets together: Not on file Attends Christianity service: Not on file Active member of club or organization: Not on file Attends meetings of clubs or organizations: Not on file Relationship status: Not on file  Intimate partner violence:  
  Fear of current or ex partner: Not on file Emotionally abused: Not on file Physically abused: Not on file Forced sexual activity: Not on file Other Topics Concern  Not on file Social History Narrative  Not on file ALLERGIES: Patient has no known allergies. Review of Systems Musculoskeletal: Negative for arthralgias and myalgias. Skin: Positive for wound. All other systems reviewed and are negative. Vitals:  
 05/14/19 1818 BP: 117/78 Pulse: 66 Resp: 16 Temp: 98.4 °F (36.9 °C) SpO2: 98% Weight: 97.5 kg (215 lb) Height: 5' 11\" (1.803 m) Physical Exam  
Constitutional: He appears well-developed and well-nourished. HENT:  
Head: Atraumatic. Eyes: EOM are normal.  
Neck: No tracheal deviation present. Pulmonary/Chest: Effort normal. No respiratory distress. Musculoskeletal: Normal range of motion. Right hand: He exhibits tenderness and laceration. He exhibits normal range of motion, no bony tenderness, normal two-point discrimination, normal capillary refill, no deformity and no swelling. Normal sensation noted. Normal strength noted. Hands: 
 3 cm laceration to the right index finger with deep flap. No tendon involvement. Full motor and sensory. See attached photo. Neurological: He is alert. Skin: Skin is warm and dry. Psychiatric: He has a normal mood and affect. His behavior is normal. Judgment and thought content normal.  
Nursing note and vitals reviewed. MDM Wound Repair 
Date/Time: 5/14/2019 7:55 PM 
Performed by: Yovana Breen provider: Rehan Bateman Preparation: skin prepped with alcohol, skin prepped with Shur-Clens and sterile field established Pre-procedure re-eval: Immediately prior to the procedure, the patient was reevaluated and found suitable for the planned procedure and any planned medications. Time out: Immediately prior to the procedure a time out was called to verify the correct patient, procedure, equipment, staff and marking as appropriate. Eh Mendoza Location details: right index finger Wound length:2.6 - 7.5 cm Anesthesia: digital block Anesthesia: 
Local Anesthetic: lidocaine 1% without epinephrine Anesthetic total: 6 mL Skin closure: Prolene Number of sutures: 8 Technique: running and interrupted Approximation: close Dressing: antibiotic ointment and pressure dressing Patient tolerance: Patient tolerated the procedure well with no immediate complications My total time at bedside, performing this procedure was 16-30 minutes. Comments: 7 running sutures and one interrupted in the center of the wound. Assessment & Plan:  
 
Orders Placed This Encounter  lidocaine (XYLOCAINE) 10 mg/mL (1 %) injection 10 mL Discussed with Evelin Whiteside MD,ED Provider Pauly Dia NP 
05/14/19 
6:47 PM 
 
Sutures out in 10-14 days. Discussed return precautions. LABORATORY TESTS: 
Labs Reviewed - No data to display IMAGING RESULTS: 
No results found. MEDICATIONS GIVEN: 
Medications  
lidocaine (PF) (XYLOCAINE) 10 mg/mL (1 %) injection 10 mL (10 mL IntraDERMal Given by Provider 5/14/19 1942) bacitracin 500 unit/gram packet 1 Packet (1 Packet Topical Given by Provider 5/14/19 1942) IMPRESSION: 
1. Laceration of right index finger without damage to nail, foreign body presence unspecified, initial encounter PLAN: 
1. Current Discharge Medication List  
  
START taking these medications Details  
bacitracin (BACITRACIN) 500 unit/gram oint Apply  to affected area three (3) times daily for 10 days. Apply to affected area on right index finger 
Qty: 1 Tube, Refills: 0  
  
  
 
2. Follow-up Information Follow up With Specialties Details Why Contact Freddy Bobby, DO Family Practice  For suture removal in 10-14 days [address] [facility] 
[phone] [facility] EMERGENCY DEPT Emergency Medicine  As needed, If symptoms worsen [address] 
[phone] 3. Return to ED for new or worsening symptoms Chiquita Thomas, NP Please note that this dictation was completed with [employer], the [email or web address] voice recognition software. Quite often unanticipated grammatical, syntax, homophones, and other interpretive errors are inadvertently transcribed by the computer software. Please disregard these errors. Please excuse any errors that have escaped final proofreading.

## 2019-05-14 NOTE — ED TRIAGE NOTES
\"I cut this finger (right second digit), I grazed it on a sledge hammer. \" Pt denies any crush injury, states there is a flap of skin. Injury is covered with blood soaked bandaid in triage. Last Tetanus shot was in the last 5 years.

## 2019-05-15 NOTE — DISCHARGE INSTRUCTIONS
Thank you for allowing us to care for you today. Please follow-up with your Primary Care provider in the next 2-3 days if your symptoms do not improve. Plan for home:     Lacerations: Your laceration was repaired with 8 sutures. They should be removed in about 10-14 days. Wash your lacerations with an antibacterial soap like DIAL. Pat dry. Cover with a layer of bacitracin ointment. Cover. Do this two to three times daily until healed. Please call, return to the ED or see your Primary Care Provider  if there are signs or symptoms of wound infection: fevers, chills, sweating, fast heartbeat, or wounds with increased warmth, increased redness, malodor (bad smelling), purulent (pus) drainage and/or pain that is increased, new or difficult to control. No Swimming, tub baths or hot tubs until your wounds have healed. Patient Education        Cuts on the Hand Closed With Stitches: Care Instructions  Your Care Instructions    A cut on your hand can be on your fingers, your thumb, or the front or back of your hand. Sometimes a cut can injure the tendons, blood vessels, or nerves of your hand. The doctor used stitches to close the cut. Using stitches also helps the cut heal and reduces scarring. The doctor may have given you a splint to help prevent you from moving your hand, fingers, or thumb. If the cut went deep and through the skin, the doctor put in two layers of stitches. The deeper layer brings the deep part of the cut together. These stitches will dissolve and don't need to be removed. The stitches in the upper layer are the ones you see on the cut. You will probably have a bandage. You will need to have the stitches removed, usually in 7 to 14 days. The doctor may suggest that you see a hand specialist if the cut is very deep or if you have trouble moving your fingers or have less feeling in your hand. The doctor has checked you carefully, but problems can develop later.  If you notice any problems or new symptoms, get medical treatment right away. Follow-up care is a key part of your treatment and safety. Be sure to make and go to all appointments, and call your doctor if you are having problems. It's also a good idea to know your test results and keep a list of the medicines you take. How can you care for yourself at home? · Keep the cut dry for the first 24 to 48 hours. After this, you can shower if your doctor okays it. Pat the cut dry. · Don't soak the cut, such as in a bathtub. Your doctor will tell you when it's safe to get the cut wet. · If your doctor told you how to care for your cut, follow your doctor's instructions. If you did not get instructions, follow this general advice:  ? After the first 24 to 48 hours, wash around the cut with clean water 2 times a day. Don't use hydrogen peroxide or alcohol, which can slow healing. ? You may cover the cut with a thin layer of petroleum jelly, such as Vaseline, and a nonstick bandage. ? Apply more petroleum jelly and replace the bandage as needed. · Prop up the sore hand on a pillow anytime you sit or lie down during the next 3 days. Try to keep it above the level of your heart. This will help reduce swelling. · Avoid any activity that could cause your cut to reopen. · Do not remove the stitches on your own. Your doctor will tell you when to come back to have the stitches removed. · Be safe with medicines. Take pain medicines exactly as directed. ? If the doctor gave you a prescription medicine for pain, take it as prescribed. ? If you are not taking a prescription pain medicine, ask your doctor if you can take an over-the-counter medicine. When should you call for help?   Call your doctor now or seek immediate medical care if:    · You have new pain, or your pain gets worse.     · The skin near the cut is cold or pale or changes color.     · You have tingling, weakness, or numbness near the cut.     · The cut starts to bleed, and blood soaks through the bandage. Oozing small amounts of blood is normal.     · You have trouble moving the area of the hand near the cut.     · You have symptoms of infection, such as:  ? Increased pain, swelling, warmth, or redness around the cut.  ? Red streaks leading from the cut.  ? Pus draining from the cut.  ? A fever.    Watch closely for changes in your health, and be sure to contact your doctor if:    · You do not get better as expected. Where can you learn more? Go to http://gonzalo-bjorn.info/. Enter T250 in the search box to learn more about \"Cuts on the Hand Closed With Stitches: Care Instructions. \"  Current as of: September 23, 2018  Content Version: 11.9  © 2712-4273 [employer]. Care instructions adapted under license by [employer] (which disclaims liability or warranty for this information). If you have questions about a medical condition or this instruction, always ask your healthcare professional. [place] [address] any warranty or liability for your use of this information.

## 2019-05-24 ENCOUNTER — OFFICE VISIT (OUTPATIENT)
Dept: FAMILY MEDICINE CLINIC | Age: 54
End: 2019-05-24

## 2019-05-24 VITALS
OXYGEN SATURATION: 96 % | WEIGHT: 222 LBS | RESPIRATION RATE: 16 BRPM | BODY MASS INDEX: 31.08 KG/M2 | SYSTOLIC BLOOD PRESSURE: 115 MMHG | HEIGHT: 71 IN | DIASTOLIC BLOOD PRESSURE: 79 MMHG | TEMPERATURE: 98.2 F | HEART RATE: 85 BPM

## 2019-05-24 DIAGNOSIS — Z48.02 VISIT FOR SUTURE REMOVAL: ICD-10-CM

## 2019-05-24 DIAGNOSIS — S61.210A LACERATION OF RIGHT INDEX FINGER WITHOUT FOREIGN BODY WITHOUT DAMAGE TO NAIL, INITIAL ENCOUNTER: Primary | ICD-10-CM

## 2019-05-24 NOTE — PROGRESS NOTES
Health Maintenance Due   Topic Date Due    Hepatitis C Screening  1965    Pneumococcal 0-64 years (1 of 1 - PPSV23) 03/04/1971    DTaP/Tdap/Td series (1 - Tdap) 03/04/1986    Shingrix Vaccine Age 50> (1 of 2) 03/04/2015    FOBT Q 1 YEAR AGE 50-75  03/04/2015

## 2019-05-24 NOTE — PROGRESS NOTES
Christiano Hicks is a 47 y.o. male who presents for suture removal.     Patient presents today for suture removal. Patient cut is right index finger 8 days ago. He was seen in the [facility] ED. Patient states he was working with a sledgehammer when the handle slipped from his hand cutting the dorsal surface of the right index finger. Patient states his tetanus is up-to-date in the last 2 years. No drainage. Denies fever, chills, nausea or vomiting. ROS: (positive in bold)  General: wt loss, fever, chills, fatigue   Skin: see HPI    Past Medical History:  Past Medical History:   Diagnosis Date    Asthma     Frequent urination     IBS (irritable bowel syndrome)     Liver disease 2012    Migraine 2006    PTSD (post-traumatic stress disorder) 2017       Past Surgical History:  Past Surgical History:   Procedure Laterality Date    HX OTHER SURGICAL  2012    left hand tendon repair       Family History:  Family History   Problem Relation Age of Onset   Clove.Goldberg Cancer Mother         colon    Cancer Father         colon       Allergies:  No Known Allergies    Social History:  Social History     Tobacco Use    Smoking status: Never Smoker    Smokeless tobacco: Former User   Substance Use Topics    Alcohol use: Not Currently     Comment: hasnt had a drink since sept 2017    Drug use: Never       Current Meds:  Current Outpatient Medications on File Prior to Visit   Medication Sig Dispense Refill    bacitracin (BACITRACIN) 500 unit/gram oint Apply  to affected area three (3) times daily for 10 days. Apply to affected area on right index finger 1 Tube 0    riboflavin, vitamin B2, (VITAMIN B-2) 100 mg tablet Take 1 Tab by mouth two (2) times a day. To reduce headache frequency 60 Tab 6    albuterol (PROVENTIL HFA, VENTOLIN HFA, PROAIR HFA) 90 mcg/actuation inhaler Take  by inhalation.  acetaminophen (TYLENOL) 325 mg tablet Take  by mouth every four (4) hours as needed for Pain.       montelukast (SINGULAIR) 10 mg tablet Take 10 mg by mouth daily.  cetirizine (ZYRTEC) 10 mg tablet Take  by mouth.  fluticasone-salmeterol (ADVAIR DISKUS) 500-50 mcg/dose diskus inhaler Take 1 Puff by inhalation every twelve (12) hours.  fluticasone (FLONASE) 50 mcg/actuation nasal spray 2 Sprays by Both Nostrils route daily.  tamsulosin (FLOMAX) 0.4 mg capsule Take 0.4 mg by mouth daily. No current facility-administered medications on file prior to visit. Visit Vitals  /79 (BP 1 Location: Left arm, BP Patient Position: Sitting)   Pulse 85   Temp 98.2 °F (36.8 °C) (Oral)   Resp 16   Ht 5' 11\" (1.803 m)   Wt 222 lb (100.7 kg)   SpO2 96%   BMI 30.96 kg/m²       Gen:  Well developed, well nourished male in no acute distress  HEENT: normocephalic/atraumaticSkin:  Healing v-shaped laceration on extensor surface of right index finger. 1 runner stitch and 1 simple suture. Card:  RRR, no m/r/g  Psych:  Nl mood and affect     Assessment/Plan:      ICD-10-CM ICD-9-CM    1. Laceration of right index finger without foreign body without damage to nail, initial encounter S61.210A 883.0 SUTURE / STAPLE REMOVAL BY PROVIDER      SUTURE REMOVAL KIT   2. Visit for suture removal Z48.02 V58.32 SUTURE / STAPLE REMOVAL BY PROVIDER      SUTURE REMOVAL KIT      Sutures in place for 10 days. Sutures removed without complications. 1 runner and 1 simple suture removed. No drainage. Wound C/D/I. Discussed wound care. Discussed signs and symptoms of when to RTC or go to the ED. I have discussed the diagnosis with the patient and the intended plan as seen in the above orders. The patient has received an after-visit summary and questions were answered concerning future plans. I have discussed medication side effects and warnings with the patient as well. The patient agrees and understands above plan. Follow-up and Dispositions    · Return if symptoms worsen or fail to improve.          Patient discussed with supervising attending.     Madi Foreman, DO

## 2019-07-29 ENCOUNTER — TELEPHONE (OUTPATIENT)
Dept: FAMILY MEDICINE CLINIC | Age: 54
End: 2019-07-29

## 2019-07-29 NOTE — TELEPHONE ENCOUNTER
ELIU--[phone]  Patient called regarding referrals. 1.  appt with urology, Dr. Radha HERNANDEZ--[phone]       Sept 10    2. appt with pulmonology, Dr. Ana GLOVER--[phone]        appt  Aug 23    3. appt with neurology, Dr. Bob Carney       --none given       Appt, not scheduled yet but will in the           next six months.

## 2019-09-09 ENCOUNTER — TELEPHONE (OUTPATIENT)
Dept: FAMILY MEDICINE CLINIC | Age: 54
End: 2019-09-09

## 2019-09-09 DIAGNOSIS — R39.15 URINARY URGENCY: Primary | ICD-10-CM

## 2019-09-09 NOTE — TELEPHONE ENCOUNTER
Dr Ferrara [place]:    This patient has an appointment on 9/10/19 with Dr Keisha Shelley (urol). ... Will you enter an order into [facility] for a Urology consult so I can submit a referral to his insurance. ... (Dx Code: R39.15). ... Any questions, call me. ...     Thanks   Durwood Denver B

## 2019-09-09 NOTE — TELEPHONE ENCOUNTER
Per call from Alma Patel at [place] office    APPT IS 9/10/19  Dr. Jazz Dickey/Urology    Office ph [phone]  [phone]      DX code R39.15

## 2019-09-19 ENCOUNTER — TELEPHONE (OUTPATIENT)
Dept: FAMILY MEDICINE CLINIC | Age: 54
End: 2019-09-19

## 2019-09-19 NOTE — TELEPHONE ENCOUNTER
Patient called stating he has an appointment with pulmonology on 9/25/19 with Dr. Stuart Jack [facility]. Patient states this is for the same issue he was seen for before. No current order on file.

## 2019-09-23 ENCOUNTER — TELEPHONE (OUTPATIENT)
Dept: FAMILY MEDICINE CLINIC | Age: 54
End: 2019-09-23

## 2019-09-23 DIAGNOSIS — J45.909 ASTHMATIC BRONCHITIS WITHOUT COMPLICATION, UNSPECIFIED ASTHMA SEVERITY, UNSPECIFIED WHETHER PERSISTENT: Primary | ICD-10-CM

## 2019-09-25 NOTE — TELEPHONE ENCOUNTER
Dr. Beto Kennedy   Received: Today   Message Contents   [address]             Referral     Caller (first and last name if not the patient or from practice): Sonia Cohen       Caller's relationship to patient (if not from a practice):  Wife       Name of caller (if calling from a practice):       Name of practice: [facility]       Specialist's title, first, and last name: Dr. Hai Madrigal       Office Phone Number: [phone]       Fax number: [phone]       Date and time of appointment: 9/25/19 @ 2:40PM       Reason for appointment: Asthma       Details to clarify the request:  Pt has an appt with Dr. Fitz Abdul at [facility] today at 2:40 PM and he needs a new referral faxed to [address] [place] [address]

## 2020-10-18 ENCOUNTER — TELEPHONE (OUTPATIENT)
Dept: FAMILY MEDICINE CLINIC | Age: 55
End: 2020-10-18

## 2020-10-18 DIAGNOSIS — L98.9 SKIN PROBLEM: Primary | ICD-10-CM

## 2020-10-18 NOTE — TELEPHONE ENCOUNTER
[facility] call center states that patient declined scheduling an appointment for this issue.    ----- Message from Sharan Adames sent at 10/16/2020  5:29 PM EDT -----  Regarding: Dr. Whalen Guess: [phone]  Pt requesting callback to discuss dermatology referral and recommendation. Pt has skin irritation on ankle for about 6 mos. Appt declined.

## 2020-10-21 NOTE — TELEPHONE ENCOUNTER
Called and left voicemail for patient tp call back. If he calls back please ask him questions in Dr. Alphonse Chen message.

## 2020-10-26 NOTE — TELEPHONE ENCOUNTER
Spoke with patient. He said the skin issue is larger than a wart. He also said he has been to a podiatrist and the doctor told him that he would just try to cut it off, but when he tried, it had blood in it. He is asking for you to suggest some dermatologist.    Also when asked him, he said that had not declined any appointment.

## 2020-10-26 NOTE — TELEPHONE ENCOUNTER
Dr. Elzbieta Fiore   Received: 2 days ago   Message Contents   Alvina Ortega [facility]               General Message/Vendor Calls     Caller's first and last name: Chris Garvey     Reason for call: Referral to dermatologist. No appts available for Dr. Ray Jacobs in October or November.      Callback required yes/no and why: yes     Best contact number(s):[phone]; best time is after 2pm.     Details to clarify the request:     Reilly Medrano

## 2020-11-06 NOTE — TELEPHONE ENCOUNTER
Dr. Joi Mendez / Telephone   Received: Today   Message Contents   Allie Blanca 39. first and last name: wife, hospitals     Reason for call: Pt's referral to Dermatology needs to be forwarded to [facility], they are stating they do not have the referral information.  Pt has appt scheduled for 11/30/20.      Callback required yes/no and why: Only if clarifications are needed     Best contact number(s): [phone]     Details to clarify the request: [facility] - [phone]

## 2021-01-20 ENCOUNTER — TELEPHONE (OUTPATIENT)
Dept: FAMILY MEDICINE CLINIC | Age: 56
End: 2021-01-20

## 2021-01-20 DIAGNOSIS — R39.15 URINARY URGENCY: Primary | ICD-10-CM

## 2021-01-20 NOTE — TELEPHONE ENCOUNTER
Augustine Nieves with [facility] calling,    Pt has appt 21 at 3:00 pm    Dr. Sari Linares    This is for pt's yearly, prev referral     Fax to [phone]     [phone]  Ext 21 [phone]

## 2021-01-22 NOTE — TELEPHONE ENCOUNTER
Will work on PA for this patient for cont of care  Will need a [facility] ref order placed in [address]  ST. CARISA DIANE Referral Coordinator

## 2021-01-22 NOTE — TELEPHONE ENCOUNTER
YBX/CW#[id number] APPROVED 4 visits 1.20.21-1.20.22 to     Thanks  Fabby Novak S/PSR  [facility] Referral Coordinator

## 2021-02-25 ENCOUNTER — TELEPHONE (OUTPATIENT)
Dept: NEUROLOGY | Age: 56
End: 2021-02-25

## 2022-03-18 PROBLEM — G43.009 MIGRAINE WITHOUT AURA AND WITHOUT STATUS MIGRAINOSUS, NOT INTRACTABLE: Status: ACTIVE | Noted: 2017-08-10

## 2022-03-19 PROBLEM — F51.04 PSYCHOPHYSIOLOGICAL INSOMNIA: Status: ACTIVE | Noted: 2018-04-26

## 2022-03-19 PROBLEM — G44.229 CHRONIC TENSION-TYPE HEADACHE, NOT INTRACTABLE: Status: ACTIVE | Noted: 2017-10-26

## 2022-03-19 PROBLEM — G44.309 POST-CONCUSSION HEADACHE: Status: ACTIVE | Noted: 2017-08-10

## 2023-05-22 NOTE — MR AVS SNAPSHOT
[facility] [address] [place] [address] [phone] Patient: Dannie Bee MRN: [id number] IVH:[id number] Visit Information Date & Time Provider Department Dept. Phone Encounter #  
 4/26/2018 11:40 AM Eugene Cantrell MD [facility] Neurology [facility] [phone] [id number] Follow-up Instructions Return in about 9 months (around 1/26/2019). Your Appointments 5/23/2018  1:30 PM  
New Patient with Sara Gamble, DO [facility] Appt Note: New Pt est PCP/CPE if time allows [facility] 3 [address] [place], [place] [phone]  
  
   
 [address] [place] [address] 3 [place] [address] Upcoming Health Maintenance Date Due Hepatitis C Screening 1965 DTaP/Tdap/Td series (1 - Tdap) 3/4/1986 FOBT Q 1 YEAR AGE 50-75 3/4/2015 Influenza Age 5 to Adult 8/1/2017 Allergies as of 4/26/2018  Review Complete On: 4/26/2018 By: Karoline Matos LPN No Known Allergies Current Immunizations  Never Reviewed No immunizations on file. Not reviewed this visit You Were Diagnosed With   
  
 Codes Comments Post-concussion headache    -  Primary ICD-10-CM: Y51.073 ICD-9-CM: 339.20 Migraine without aura and without status migrainosus, not intractable     ICD-10-CM: K87.016 ICD-9-CM: 346.10 Vitals BP Pulse Height(growth percentile) Weight(growth percentile) SpO2 BMI  
 120/72 (BP 1 Location: Left arm, BP Patient Position: Sitting) 68 5' 11\" (1.803 m) 191 lb (86.6 kg) 99% 26.64 kg/m2 Smoking Status Former Smoker Vitals History BMI and BSA Data Body Mass Index Body Surface Area  
 26.64 kg/m 2 2.08 m 2 Preferred Pharmacy Pharmacy Name Phone [id number] - [address], [address] [facility] [phone] Your Updated Medication List  
  
   
This list is accurate as of 4/26/18 12:10 PM.  Always use your most recent med list.  
  
  
  
  
 acetaminophen 325 mg tablet Commonly known as:  TYLENOL Take  by mouth every four (4) hours as needed for Pain. ADVAIR DISKUS 500-50 mcg/dose diskus inhaler Generic drug:  fluticasone-salmeterol Take 1 Puff by inhalation every twelve (12) hours. albuterol 90 mcg/actuation inhaler Commonly known as:  PROVENTIL HFA, VENTOLIN HFA, PROAIR HFA Take  by inhalation. CELEBREX PO Take  by mouth. cetirizine 10 mg tablet Commonly known as:  ZYRTEC Take  by mouth. fish oil-omega-3 fatty acids 340-1,000 mg capsule Take 1 Cap by mouth daily. FLOMAX 0.4 mg capsule Generic drug:  tamsulosin Take 0.4 mg by mouth daily. fluticasone 50 mcg/actuation nasal spray Commonly known as:  Vannesa Wanda 2 Sprays by Both Nostrils route daily. gabapentin 300 mg capsule Commonly known as:  NEURONTIN Take 300 mg by mouth. 2 caps PO qhs  
  
 LEVAQUIN 500 mg tablet Generic drug:  levoFLOXacin Take  by mouth daily. LUNESTA 2 mg tablet Generic drug:  eszopiclone Take 3 mg by mouth nightly. methylPREDNISolone 4 mg tablet Commonly known as:  Torrey Carol Take as directed on package. Take with food in AM.  For rebound headache.  
  
 montelukast 10 mg tablet Commonly known as:  SINGULAIR Take 10 mg by mouth daily. omeprazole 20 mg capsule Commonly known as:  PRILOSEC Take 20 mg by mouth daily. prazosin 2 mg capsule Commonly known as:  MINIPRESS Take 2 mg by mouth nightly. QUEtiapine 50 mg tablet Commonly known as:  SEROquel TAKE 1 TABLET BY MOUTH AT BEDTIME  
  
 SUMAtriptan 100 mg tablet Commonly known as:  IMITREX  
1 tab at onset of severe headache/ migraine. Repeat 1 tab in 2 hours if headache remains. Limit: max 2 days in a week. topiramate 50 mg tablet Commonly known as:  TOPAMAX Take 1 tab twice a day for migraine prevention  
  
 trimethoprim-sulfamethoxazole 160-800 mg per tablet Commonly known as:  BACTRIM DS, SEPTRA DS Take 1 Tab by mouth two (2) times a day. ZOLOFT 100 mg tablet Generic drug:  sertraline Take  by mouth daily. Prescriptions Sent to Pharmacy Refills  
 topiramate (TOPAMAX) 50 mg tablet 2 Sig: Take 1 tab twice a day for migraine prevention Class: Normal  
 Pharmacy: [place] [address], [address] [facility] Ph #: [phone] SUMAtriptan (IMITREX) 100 mg tablet 2 Si tab at onset of severe headache/ migraine. Repeat 1 tab in 2 hours if headache remains. Limit: max 2 days in a week. Class: Normal  
 Pharmacy: [place] [address], [address] [facility] Ph #: [phone] Follow-up Instructions Return in about 9 months (around 2019). Introducing [facility]! [employer] introduces [employer] patient portal. Now you can access parts of your medical record, email your doctor's office, and request medication refills online. 1. In your internet browser, go to [email or web address] 2. Click on the First Time User? Click Here link in the Sign In box. You will see the New Member Sign Up page. 3. Enter your [employer] Access Code exactly as it appears below. You will not need to use this code after youve completed the sign-up process. If you do not sign up before the expiration date, you must request a new code. · [employer] Access Code: [email or web address] Expires: 2018 12:10 PM 
 
4. Enter the last four digits of your Social Security Number (xxxx) and Date of Birth (mm/dd/yyyy) as indicated and click Submit. You will be taken to the next sign-up page. 5. Create a [employer] ID.  This will be your [employer] login ID and cannot be changed, so think of one that is secure and easy to remember. 6. Create a [employer] password. You can change your password at any time. 7. Enter your Password Reset Question and Answer. This can be used at a later time if you forget your password. 8. Enter your e-mail address. You will receive e-mail notification when new information is available in [address] 9. Click Sign Up. You can now view and download portions of your medical record. 10. Click the Download Summary menu link to download a portable copy of your medical information. If you have questions, please visit the Frequently Asked Questions section of the [employer] website. Remember, [employer] is NOT to be used for urgent needs. For medical emergencies, dial 911. Now available from your iPhone and Android! Please provide this summary of care documentation to your next provider. Your primary care clinician is listed as Vesta García. If you have any questions after today's visit, please call [phone]. Minoxidil Pregnancy And Lactation Text: This medication has not been assigned a Pregnancy Risk Category but animal studies failed to show danger with the topical medication. It is unknown if the medication is excreted in breast milk.

## 2024-10-21 ENCOUNTER — TRANSCRIBE ORDERS (OUTPATIENT)
Facility: HOSPITAL | Age: 59
End: 2024-10-21

## 2024-10-21 DIAGNOSIS — J32.9 SINUSITIS, UNSPECIFIED CHRONICITY, UNSPECIFIED LOCATION: Primary | ICD-10-CM

## 2024-12-13 ENCOUNTER — OFFICE VISIT (OUTPATIENT)
Age: 59
End: 2024-12-13

## 2024-12-13 VITALS
TEMPERATURE: 97.9 F | DIASTOLIC BLOOD PRESSURE: 88 MMHG | RESPIRATION RATE: 18 BRPM | SYSTOLIC BLOOD PRESSURE: 130 MMHG | HEIGHT: 71 IN | BODY MASS INDEX: 28.76 KG/M2 | HEART RATE: 91 BPM | OXYGEN SATURATION: 96 % | WEIGHT: 205.4 LBS

## 2024-12-13 DIAGNOSIS — J40 BRONCHITIS: ICD-10-CM

## 2024-12-13 DIAGNOSIS — R05.1 ACUTE COUGH: Primary | ICD-10-CM

## 2024-12-13 RX ORDER — PREDNISONE 20 MG/1
TABLET ORAL
Qty: 21 TABLET | Refills: 0 | Status: SHIPPED | OUTPATIENT
Start: 2024-12-13

## 2024-12-13 RX ORDER — IPRATROPIUM BROMIDE AND ALBUTEROL SULFATE 2.5; .5 MG/3ML; MG/3ML
SOLUTION RESPIRATORY (INHALATION)
COMMUNITY
Start: 2024-04-15

## 2024-12-13 RX ORDER — SILDENAFIL 100 MG/1
100 TABLET, FILM COATED ORAL
COMMUNITY
Start: 2024-03-05

## 2024-12-13 RX ORDER — CETIRIZINE HYDROCHLORIDE 10 MG/1
10 TABLET ORAL
COMMUNITY
Start: 2024-11-26

## 2024-12-13 RX ORDER — SUMATRIPTAN SUCCINATE 100 MG/1
100 TABLET ORAL
COMMUNITY
Start: 2024-05-14

## 2024-12-13 RX ORDER — SERTRALINE HYDROCHLORIDE 100 MG/1
200 TABLET, FILM COATED ORAL
COMMUNITY
Start: 2024-09-19

## 2024-12-13 RX ORDER — FLUTICASONE PROPIONATE AND SALMETEROL XINAFOATE 45; 21 UG/1; UG/1
2 AEROSOL, METERED RESPIRATORY (INHALATION) 2 TIMES DAILY
COMMUNITY

## 2024-12-13 RX ORDER — TADALAFIL 5 MG/1
TABLET ORAL
COMMUNITY
Start: 2024-11-20

## 2024-12-13 RX ORDER — BUSPIRONE HYDROCHLORIDE 5 MG/1
15 TABLET ORAL
COMMUNITY
Start: 2024-09-19

## 2024-12-13 ASSESSMENT — ENCOUNTER SYMPTOMS
COUGH: 1
SORE THROAT: 1
SINUS PRESSURE: 1

## 2024-12-13 NOTE — PROGRESS NOTES
2024   Jamaica Puente (: 1965) is a 59 y.o. male, New patient, here for evaluation of the following chief complaint(s):  Generalized Body Aches (Pt c/o symptoms started a week ago. Hx of pneumonia and bronchitis ), Nasal Congestion, Pharyngitis, Cough, and Headache     ASSESSMENT/PLAN:  Below is the assessment and plan developed based on review of pertinent history, physical exam, labs, studies, and medications.  1. Acute cough  -     XR CHEST (2 VW); Future  2. Bronchitis  -     External Referral To Pulmonology    - Augmentin  - Prednisone     Handout given with care instructions  2. OTC for symptom management. Increase fluid intake, ensure adequate nutritional intake.  3. Follow up with PCP as needed.  4. Go to ED with development of any acute symptoms.     Follow up:  Return if symptoms worsen or fail to improve.  Follow up immediately for any new, worsening or changes or if symptoms are not improving over the next 5-7 days.     SUBJECTIVE/OBJECTIVE:    Generalized Body Aches  Associated symptoms: congestion, cough, ear pain, fatigue, headaches, myalgias and sore throat    Associated symptoms: no fever    Pharyngitis  Associated symptoms: congestion, cough, ear pain, fatigue, headaches, myalgias and sore throat    Associated symptoms: no fever    Cough  Associated symptoms include ear pain, headaches, myalgias and a sore throat. Pertinent negatives include no fever.   Headache         Generalized Body Aches (Pt c/o symptoms started a week ago. Hx of pneumonia and bronchitis ), Nasal Congestion, Pharyngitis, Cough, and Headache      Results for orders placed or performed in visit on 24   XR CHEST (2 VW)    Narrative    CHEST X-RAY    TECHNIQUE:  PA and lateral projections.    COMPARISON: None.    FINDINGS:      Lungs:  Bilateral lung fields are clear.  Pleura: No evidence of effusion.  Cardiac: Cardiac silhouette is within normal limits.  Mediastinum: Mediastinum is unremarkable.  Bones: No
